# Patient Record
Sex: MALE | Race: ASIAN | NOT HISPANIC OR LATINO | Employment: UNEMPLOYED | URBAN - METROPOLITAN AREA
[De-identification: names, ages, dates, MRNs, and addresses within clinical notes are randomized per-mention and may not be internally consistent; named-entity substitution may affect disease eponyms.]

---

## 2017-03-27 ENCOUNTER — COMMUNICATION - HEALTHEAST (OUTPATIENT)
Dept: FAMILY MEDICINE | Facility: CLINIC | Age: 62
End: 2017-03-27

## 2017-03-27 ENCOUNTER — COMMUNICATION - HEALTHEAST (OUTPATIENT)
Dept: ADMINISTRATIVE | Facility: CLINIC | Age: 62
End: 2017-03-27

## 2017-03-27 DIAGNOSIS — E11.9 TYPE 2 DIABETES MELLITUS (H): ICD-10-CM

## 2018-01-09 ENCOUNTER — OFFICE VISIT - HEALTHEAST (OUTPATIENT)
Dept: FAMILY MEDICINE | Facility: CLINIC | Age: 63
End: 2018-01-09

## 2018-01-09 DIAGNOSIS — J45.20 MILD INTERMITTENT ASTHMA WITHOUT COMPLICATION: ICD-10-CM

## 2018-01-09 DIAGNOSIS — I10 ESSENTIAL HYPERTENSION WITH GOAL BLOOD PRESSURE LESS THAN 140/90: ICD-10-CM

## 2018-01-09 DIAGNOSIS — E78.2 MIXED HYPERLIPIDEMIA: ICD-10-CM

## 2018-01-09 DIAGNOSIS — E11.9 TYPE 2 DIABETES MELLITUS WITHOUT COMPLICATION, WITHOUT LONG-TERM CURRENT USE OF INSULIN (H): ICD-10-CM

## 2018-01-09 DIAGNOSIS — F51.01 PRIMARY INSOMNIA: ICD-10-CM

## 2018-01-09 DIAGNOSIS — Z23 NEED FOR VACCINATION: ICD-10-CM

## 2018-01-09 ASSESSMENT — MIFFLIN-ST. JEOR: SCORE: 1362.12

## 2018-04-10 ENCOUNTER — OFFICE VISIT - HEALTHEAST (OUTPATIENT)
Dept: FAMILY MEDICINE | Facility: CLINIC | Age: 63
End: 2018-04-10

## 2018-04-10 DIAGNOSIS — E78.2 MIXED HYPERLIPIDEMIA: ICD-10-CM

## 2018-04-10 DIAGNOSIS — E11.9 TYPE 2 DIABETES MELLITUS WITHOUT COMPLICATION, WITHOUT LONG-TERM CURRENT USE OF INSULIN (H): ICD-10-CM

## 2018-04-10 LAB
ALBUMIN SERPL-MCNC: 3.9 G/DL (ref 3.5–5)
ALP SERPL-CCNC: 87 U/L (ref 45–120)
ALT SERPL W P-5'-P-CCNC: 10 U/L (ref 0–45)
ANION GAP SERPL CALCULATED.3IONS-SCNC: 12 MMOL/L (ref 5–18)
AST SERPL W P-5'-P-CCNC: 16 U/L (ref 0–40)
BILIRUB SERPL-MCNC: 0.4 MG/DL (ref 0–1)
BUN SERPL-MCNC: 17 MG/DL (ref 8–22)
CALCIUM SERPL-MCNC: 9.1 MG/DL (ref 8.5–10.5)
CHLORIDE BLD-SCNC: 102 MMOL/L (ref 98–107)
CO2 SERPL-SCNC: 23 MMOL/L (ref 22–31)
CREAT SERPL-MCNC: 0.8 MG/DL (ref 0.7–1.3)
GFR SERPL CREATININE-BSD FRML MDRD: >60 ML/MIN/1.73M2
GLUCOSE BLD-MCNC: 256 MG/DL (ref 70–125)
HBA1C MFR BLD: 11.1 % (ref 3.5–6)
LDLC SERPL CALC-MCNC: 133 MG/DL
POTASSIUM BLD-SCNC: 4.5 MMOL/L (ref 3.5–5)
PROT SERPL-MCNC: 7.3 G/DL (ref 6–8)
SODIUM SERPL-SCNC: 137 MMOL/L (ref 136–145)

## 2018-04-10 ASSESSMENT — MIFFLIN-ST. JEOR: SCORE: 1396.14

## 2018-07-13 ENCOUNTER — OFFICE VISIT - HEALTHEAST (OUTPATIENT)
Dept: FAMILY MEDICINE | Facility: CLINIC | Age: 63
End: 2018-07-13

## 2018-07-13 DIAGNOSIS — E78.2 MIXED HYPERLIPIDEMIA: ICD-10-CM

## 2018-07-13 DIAGNOSIS — E11.9 TYPE 2 DIABETES MELLITUS WITHOUT COMPLICATION, WITHOUT LONG-TERM CURRENT USE OF INSULIN (H): ICD-10-CM

## 2018-07-13 DIAGNOSIS — I10 ESSENTIAL HYPERTENSION WITH GOAL BLOOD PRESSURE LESS THAN 140/90: ICD-10-CM

## 2018-07-13 LAB
HBA1C MFR BLD: 8.5 % (ref 3.5–6)
LDLC SERPL CALC-MCNC: 142 MG/DL

## 2018-07-13 ASSESSMENT — MIFFLIN-ST. JEOR: SCORE: 1397.28

## 2018-08-17 ENCOUNTER — OFFICE VISIT - HEALTHEAST (OUTPATIENT)
Dept: FAMILY MEDICINE | Facility: CLINIC | Age: 63
End: 2018-08-17

## 2018-08-17 DIAGNOSIS — I10 ESSENTIAL HYPERTENSION WITH GOAL BLOOD PRESSURE LESS THAN 140/90: ICD-10-CM

## 2018-08-17 DIAGNOSIS — E11.9 TYPE 2 DIABETES MELLITUS WITHOUT COMPLICATION, WITHOUT LONG-TERM CURRENT USE OF INSULIN (H): ICD-10-CM

## 2018-08-17 DIAGNOSIS — E78.2 MIXED HYPERLIPIDEMIA: ICD-10-CM

## 2018-08-17 LAB
CREAT UR-MCNC: 54.4 MG/DL
MICROALBUMIN UR-MCNC: 0.51 MG/DL (ref 0–1.99)
MICROALBUMIN/CREAT UR: 9.4 MG/G

## 2018-08-17 ASSESSMENT — MIFFLIN-ST. JEOR: SCORE: 1393.88

## 2018-09-28 ENCOUNTER — OFFICE VISIT - HEALTHEAST (OUTPATIENT)
Dept: FAMILY MEDICINE | Facility: CLINIC | Age: 63
End: 2018-09-28

## 2018-09-28 DIAGNOSIS — I10 ESSENTIAL HYPERTENSION WITH GOAL BLOOD PRESSURE LESS THAN 140/90: ICD-10-CM

## 2018-09-28 DIAGNOSIS — Z23 NEED FOR VACCINATION: ICD-10-CM

## 2018-09-28 ASSESSMENT — MIFFLIN-ST. JEOR: SCORE: 1372.33

## 2018-11-09 ENCOUNTER — OFFICE VISIT - HEALTHEAST (OUTPATIENT)
Dept: FAMILY MEDICINE | Facility: CLINIC | Age: 63
End: 2018-11-09

## 2018-11-09 DIAGNOSIS — E78.2 MIXED HYPERLIPIDEMIA: ICD-10-CM

## 2018-11-09 DIAGNOSIS — E11.9 TYPE 2 DIABETES MELLITUS WITHOUT COMPLICATION, WITHOUT LONG-TERM CURRENT USE OF INSULIN (H): ICD-10-CM

## 2018-11-09 LAB
HBA1C MFR BLD: 11.4 % (ref 3.5–6)
LDLC SERPL CALC-MCNC: 126 MG/DL

## 2018-11-09 ASSESSMENT — MIFFLIN-ST. JEOR: SCORE: 1364.39

## 2019-02-15 ENCOUNTER — OFFICE VISIT - HEALTHEAST (OUTPATIENT)
Dept: FAMILY MEDICINE | Facility: CLINIC | Age: 64
End: 2019-02-15

## 2019-02-15 DIAGNOSIS — E11.9 TYPE 2 DIABETES MELLITUS WITHOUT COMPLICATION, WITHOUT LONG-TERM CURRENT USE OF INSULIN (H): ICD-10-CM

## 2019-02-15 DIAGNOSIS — E78.2 MIXED HYPERLIPIDEMIA: ICD-10-CM

## 2019-02-15 DIAGNOSIS — J45.20 MILD INTERMITTENT ASTHMA WITHOUT COMPLICATION: ICD-10-CM

## 2019-02-15 DIAGNOSIS — F51.01 PRIMARY INSOMNIA: ICD-10-CM

## 2019-02-15 DIAGNOSIS — I10 ESSENTIAL HYPERTENSION WITH GOAL BLOOD PRESSURE LESS THAN 140/90: ICD-10-CM

## 2019-02-15 LAB — HBA1C MFR BLD: 10.7 % (ref 3.5–6)

## 2019-02-15 ASSESSMENT — MIFFLIN-ST. JEOR: SCORE: 1370.06

## 2019-05-13 ENCOUNTER — COMMUNICATION - HEALTHEAST (OUTPATIENT)
Dept: FAMILY MEDICINE | Facility: CLINIC | Age: 64
End: 2019-05-13

## 2019-05-21 ENCOUNTER — OFFICE VISIT - HEALTHEAST (OUTPATIENT)
Dept: FAMILY MEDICINE | Facility: CLINIC | Age: 64
End: 2019-05-21

## 2019-05-21 DIAGNOSIS — E11.9 TYPE 2 DIABETES MELLITUS WITHOUT COMPLICATION, WITHOUT LONG-TERM CURRENT USE OF INSULIN (H): ICD-10-CM

## 2019-05-21 LAB
ALBUMIN SERPL-MCNC: 4.1 G/DL (ref 3.5–5)
ALP SERPL-CCNC: 96 U/L (ref 45–120)
ALT SERPL W P-5'-P-CCNC: <9 U/L (ref 0–45)
ANION GAP SERPL CALCULATED.3IONS-SCNC: 10 MMOL/L (ref 5–18)
AST SERPL W P-5'-P-CCNC: 13 U/L (ref 0–40)
BILIRUB SERPL-MCNC: 0.4 MG/DL (ref 0–1)
BUN SERPL-MCNC: 15 MG/DL (ref 8–22)
CALCIUM SERPL-MCNC: 9.7 MG/DL (ref 8.5–10.5)
CHLORIDE BLD-SCNC: 101 MMOL/L (ref 98–107)
CO2 SERPL-SCNC: 26 MMOL/L (ref 22–31)
CREAT SERPL-MCNC: 0.84 MG/DL (ref 0.7–1.3)
GFR SERPL CREATININE-BSD FRML MDRD: >60 ML/MIN/1.73M2
GLUCOSE BLD-MCNC: 224 MG/DL (ref 70–125)
HBA1C MFR BLD: 11.4 % (ref 3.5–6)
POTASSIUM BLD-SCNC: 4.4 MMOL/L (ref 3.5–5)
PROT SERPL-MCNC: 7.5 G/DL (ref 6–8)
SODIUM SERPL-SCNC: 137 MMOL/L (ref 136–145)

## 2019-05-21 ASSESSMENT — MIFFLIN-ST. JEOR: SCORE: 1382.41

## 2019-07-05 ENCOUNTER — COMMUNICATION - HEALTHEAST (OUTPATIENT)
Dept: FAMILY MEDICINE | Facility: CLINIC | Age: 64
End: 2019-07-05

## 2019-07-05 DIAGNOSIS — I10 ESSENTIAL HYPERTENSION WITH GOAL BLOOD PRESSURE LESS THAN 140/90: ICD-10-CM

## 2019-08-23 ENCOUNTER — OFFICE VISIT - HEALTHEAST (OUTPATIENT)
Dept: FAMILY MEDICINE | Facility: CLINIC | Age: 64
End: 2019-08-23

## 2019-08-23 DIAGNOSIS — E11.9 TYPE 2 DIABETES MELLITUS WITHOUT COMPLICATION, WITHOUT LONG-TERM CURRENT USE OF INSULIN (H): ICD-10-CM

## 2019-08-23 LAB
CREAT UR-MCNC: 154.2 MG/DL
HBA1C MFR BLD: 9 % (ref 3.5–6)
MICROALBUMIN UR-MCNC: 3.53 MG/DL (ref 0–1.99)
MICROALBUMIN/CREAT UR: 22.9 MG/G

## 2019-08-23 ASSESSMENT — MIFFLIN-ST. JEOR: SCORE: 1402.79

## 2019-08-26 RX ORDER — GLUCOSAMINE HCL/CHONDROITIN SU 500-400 MG
1 CAPSULE ORAL DAILY
Qty: 50 STRIP | Refills: 11 | Status: SHIPPED | OUTPATIENT
Start: 2019-08-26 | End: 2022-08-27

## 2019-08-29 ENCOUNTER — COMMUNICATION - HEALTHEAST (OUTPATIENT)
Dept: FAMILY MEDICINE | Facility: CLINIC | Age: 64
End: 2019-08-29

## 2019-08-29 DIAGNOSIS — E78.2 MIXED HYPERLIPIDEMIA: ICD-10-CM

## 2019-09-09 ENCOUNTER — COMMUNICATION - HEALTHEAST (OUTPATIENT)
Dept: ADMINISTRATIVE | Facility: CLINIC | Age: 64
End: 2019-09-09

## 2019-10-14 ENCOUNTER — COMMUNICATION - HEALTHEAST (OUTPATIENT)
Dept: FAMILY MEDICINE | Facility: CLINIC | Age: 64
End: 2019-10-14

## 2019-10-18 ENCOUNTER — OFFICE VISIT - HEALTHEAST (OUTPATIENT)
Dept: FAMILY MEDICINE | Facility: CLINIC | Age: 64
End: 2019-10-18

## 2019-10-18 DIAGNOSIS — Z23 NEED FOR IMMUNIZATION AGAINST INFLUENZA: ICD-10-CM

## 2019-10-18 DIAGNOSIS — R41.3 MEMORY LOSS: ICD-10-CM

## 2019-10-18 RX ORDER — BLOOD-GLUCOSE METER
EACH MISCELLANEOUS
Refills: 0 | Status: SHIPPED | COMMUNITY
Start: 2019-08-26 | End: 2022-08-27

## 2019-10-18 ASSESSMENT — MIFFLIN-ST. JEOR: SCORE: 1406.19

## 2019-10-25 ENCOUNTER — COMMUNICATION - HEALTHEAST (OUTPATIENT)
Dept: FAMILY MEDICINE | Facility: CLINIC | Age: 64
End: 2019-10-25

## 2019-11-20 ENCOUNTER — COMMUNICATION - HEALTHEAST (OUTPATIENT)
Dept: EDUCATION SERVICES | Facility: CLINIC | Age: 64
End: 2019-11-20

## 2019-11-20 ENCOUNTER — AMBULATORY - HEALTHEAST (OUTPATIENT)
Dept: EDUCATION SERVICES | Facility: CLINIC | Age: 64
End: 2019-11-20

## 2019-11-22 ENCOUNTER — COMMUNICATION - HEALTHEAST (OUTPATIENT)
Dept: FAMILY MEDICINE | Facility: CLINIC | Age: 64
End: 2019-11-22

## 2020-01-20 ENCOUNTER — OFFICE VISIT - HEALTHEAST (OUTPATIENT)
Dept: FAMILY MEDICINE | Facility: CLINIC | Age: 65
End: 2020-01-20

## 2020-01-20 DIAGNOSIS — F51.01 PRIMARY INSOMNIA: ICD-10-CM

## 2020-01-20 DIAGNOSIS — E11.9 TYPE 2 DIABETES MELLITUS WITHOUT COMPLICATION, WITHOUT LONG-TERM CURRENT USE OF INSULIN (H): ICD-10-CM

## 2020-01-20 DIAGNOSIS — I10 ESSENTIAL HYPERTENSION WITH GOAL BLOOD PRESSURE LESS THAN 140/90: ICD-10-CM

## 2020-01-20 DIAGNOSIS — J45.20 MILD INTERMITTENT ASTHMA WITHOUT COMPLICATION: ICD-10-CM

## 2020-01-20 DIAGNOSIS — E78.2 MIXED HYPERLIPIDEMIA: ICD-10-CM

## 2020-01-20 RX ORDER — ALBUTEROL SULFATE 90 UG/1
2 AEROSOL, METERED RESPIRATORY (INHALATION) EVERY 4 HOURS PRN
Qty: 18 G | Refills: 11 | Status: SHIPPED | OUTPATIENT
Start: 2020-01-20 | End: 2021-10-04

## 2020-01-20 ASSESSMENT — MIFFLIN-ST. JEOR: SCORE: 1366.53

## 2020-01-21 LAB — HBA1C MFR BLD: 12.4 % (ref 4.2–6.1)

## 2020-07-24 ENCOUNTER — OFFICE VISIT - HEALTHEAST (OUTPATIENT)
Dept: FAMILY MEDICINE | Facility: CLINIC | Age: 65
End: 2020-07-24

## 2020-07-24 DIAGNOSIS — E11.9 TYPE 2 DIABETES MELLITUS WITHOUT COMPLICATION, WITHOUT LONG-TERM CURRENT USE OF INSULIN (H): ICD-10-CM

## 2020-07-24 ASSESSMENT — PATIENT HEALTH QUESTIONNAIRE - PHQ9: SUM OF ALL RESPONSES TO PHQ QUESTIONS 1-9: 0

## 2021-01-29 ENCOUNTER — OFFICE VISIT - HEALTHEAST (OUTPATIENT)
Dept: FAMILY MEDICINE | Facility: CLINIC | Age: 66
End: 2021-01-29

## 2021-01-29 ENCOUNTER — COMMUNICATION - HEALTHEAST (OUTPATIENT)
Dept: FAMILY MEDICINE | Facility: CLINIC | Age: 66
End: 2021-01-29

## 2021-01-29 DIAGNOSIS — Z23 IMMUNIZATION DUE: ICD-10-CM

## 2021-01-29 DIAGNOSIS — E78.2 MIXED HYPERLIPIDEMIA: ICD-10-CM

## 2021-01-29 DIAGNOSIS — E11.9 TYPE 2 DIABETES MELLITUS WITHOUT COMPLICATION, WITHOUT LONG-TERM CURRENT USE OF INSULIN (H): ICD-10-CM

## 2021-01-29 DIAGNOSIS — I10 ESSENTIAL HYPERTENSION WITH GOAL BLOOD PRESSURE LESS THAN 140/90: ICD-10-CM

## 2021-01-29 LAB
CREAT UR-MCNC: 36.5 MG/DL
HBA1C MFR BLD: 9.2 %
MICROALBUMIN UR-MCNC: 0.5 MG/DL (ref 0–1.99)
MICROALBUMIN/CREAT UR: 13.7 MG/G

## 2021-01-29 ASSESSMENT — MIFFLIN-ST. JEOR: SCORE: 1415.42

## 2021-02-01 ENCOUNTER — COMMUNICATION - HEALTHEAST (OUTPATIENT)
Dept: FAMILY MEDICINE | Facility: CLINIC | Age: 66
End: 2021-02-01

## 2021-02-01 DIAGNOSIS — E11.9 TYPE 2 DIABETES MELLITUS WITHOUT COMPLICATION, WITHOUT LONG-TERM CURRENT USE OF INSULIN (H): ICD-10-CM

## 2021-02-01 DIAGNOSIS — I10 ESSENTIAL HYPERTENSION WITH GOAL BLOOD PRESSURE LESS THAN 140/90: ICD-10-CM

## 2021-02-01 DIAGNOSIS — E78.2 MIXED HYPERLIPIDEMIA: ICD-10-CM

## 2021-02-01 DIAGNOSIS — F51.01 PRIMARY INSOMNIA: ICD-10-CM

## 2021-02-02 RX ORDER — ATORVASTATIN CALCIUM 20 MG/1
20 TABLET, FILM COATED ORAL DAILY
Qty: 30 TABLET | Refills: 11 | Status: SHIPPED | OUTPATIENT
Start: 2021-02-02 | End: 2021-10-04

## 2021-02-02 RX ORDER — LISINOPRIL 20 MG/1
20 TABLET ORAL DAILY
Qty: 30 TABLET | Refills: 11 | Status: SHIPPED | OUTPATIENT
Start: 2021-02-02 | End: 2021-10-04

## 2021-02-02 RX ORDER — GLIPIZIDE 5 MG/1
10 TABLET, FILM COATED, EXTENDED RELEASE ORAL
Qty: 60 TABLET | Refills: 11 | Status: SHIPPED | OUTPATIENT
Start: 2021-02-02 | End: 2021-10-04

## 2021-02-02 RX ORDER — TRAZODONE HYDROCHLORIDE 50 MG/1
TABLET, FILM COATED ORAL
Qty: 30 TABLET | Refills: 11 | Status: SHIPPED | OUTPATIENT
Start: 2021-02-02 | End: 2021-10-04

## 2021-02-26 ENCOUNTER — COMMUNICATION - HEALTHEAST (OUTPATIENT)
Dept: FAMILY MEDICINE | Facility: CLINIC | Age: 66
End: 2021-02-26

## 2021-03-15 ENCOUNTER — COMMUNICATION - HEALTHEAST (OUTPATIENT)
Dept: FAMILY MEDICINE | Facility: CLINIC | Age: 66
End: 2021-03-15

## 2021-03-19 ENCOUNTER — COMMUNICATION - HEALTHEAST (OUTPATIENT)
Dept: FAMILY MEDICINE | Facility: CLINIC | Age: 66
End: 2021-03-19

## 2021-04-02 ENCOUNTER — OFFICE VISIT - HEALTHEAST (OUTPATIENT)
Dept: FAMILY MEDICINE | Facility: CLINIC | Age: 66
End: 2021-04-02

## 2021-04-02 ENCOUNTER — COMMUNICATION - HEALTHEAST (OUTPATIENT)
Dept: FAMILY MEDICINE | Facility: CLINIC | Age: 66
End: 2021-04-02

## 2021-04-02 DIAGNOSIS — I10 ESSENTIAL HYPERTENSION WITH GOAL BLOOD PRESSURE LESS THAN 140/90: ICD-10-CM

## 2021-04-02 RX ORDER — AMLODIPINE BESYLATE 10 MG/1
10 TABLET ORAL DAILY
Qty: 30 TABLET | Refills: 11 | Status: SHIPPED | OUTPATIENT
Start: 2021-04-02 | End: 2021-10-04

## 2021-04-02 ASSESSMENT — MIFFLIN-ST. JEOR: SCORE: 1417.69

## 2021-04-10 ENCOUNTER — AMBULATORY - HEALTHEAST (OUTPATIENT)
Dept: NURSING | Facility: CLINIC | Age: 66
End: 2021-04-10

## 2021-05-27 ASSESSMENT — PATIENT HEALTH QUESTIONNAIRE - PHQ9: SUM OF ALL RESPONSES TO PHQ QUESTIONS 1-9: 0

## 2021-05-28 ASSESSMENT — ASTHMA QUESTIONNAIRES: ACT_TOTALSCORE: 25

## 2021-05-28 NOTE — TELEPHONE ENCOUNTER
"  *  Siasto. [address] [place] [address], [place] [address]. Brian Industries, MeetMoi. [address] [place] [id number]. All rights reserved. This information is not intended as a substitute for professional medical care. Always follow your healthcare professional's instructions.

## 2021-05-28 NOTE — TELEPHONE ENCOUNTER
Who is calling:  Patient's daughter in law Cynthia  Reason for Call:  Calling to cancel and reschedule patient's appointment. He is now scheduled for May 21st at 3:20 PM at [facility]. Can transportation be arranged? Thank you.   Date of last appointment with primary care: 2/15/19  Okay to leave a detailed message: Yes

## 2021-05-29 NOTE — PROGRESS NOTES
"Assessment: /    Plan:    1. Type 2 diabetes mellitus without complication, without long-term current use of insulin (H)  Comprehensive Metabolic Panel    Glycosylated Hemoglobin A1c    metFORMIN (GLUCOPHAGE) 500 MG tablet       He will take 2 of the glipizide daily.  Increase metformin to 2 tablets twice daily.    Prescription written for glucometer, test strips, lancets and lancing device.  He will bring this to his pharmacy.    Recheck in 3 months.  Patient was seen with professional Desiree , Stanley Reed.      Subjective:    HPI:  Jorge Bourne is a 64-year-old male presenting for follow-up on diabetes.  He has only been taking 1 of the glipizide each day instead of 2.  He takes metformin, 2 tablets in the morning and 1 in the evening.  He eats rice and fish.    His glucometer is old, and he is no longer able to get strips for it.      Review of Systems: No fever or cough.      Current Outpatient Medications   Medication Sig Dispense Refill     albuterol (PROAIR HFA;PROVENTIL HFA;VENTOLIN HFA) 90 mcg/actuation inhaler Inhale 2 puffs every 4 (four) hours as needed. 18 g 11     atorvastatin (LIPITOR) 20 MG tablet Take 1 tablet (20 mg total) by mouth daily. 30 tablet 11     glipiZIDE (GLUCOTROL XL) 5 MG 24 hr tablet Take 2 tablets (10 mg total) by mouth daily with breakfast. 60 tablet 11     lisinopril (PRINIVIL,ZESTRIL) 20 MG tablet Take 1 tablet (20 mg total) by mouth daily. 30 tablet 11     metFORMIN (GLUCOPHAGE) 500 MG tablet Take 2 tablets (1,000 mg total) by mouth 2 (two) times a day with meals. 120 tablet 11     traZODone (DESYREL) 50 MG tablet Take 1/2 to 1 Tablet by Mouth Nightly At Bedtime as Needed For Sleep 30 tablet 11     No current facility-administered medications for this visit.          Objective:    Vitals:    05/21/19 1523 05/21/19 1527   BP: 144/84 134/76   Pulse: 73    Resp: 19    Temp: 98.7  F (37.1  C)    TempSrc: Oral    SpO2: 97%    Weight: 153 lb 12 oz (69.7 kg)    Height: 5' 2.99\" (1.6 " m)        Gen:  NAD, VSS  Lungs:  normal  Heart:  normal  Feet without ulceration, normal monofilament testing    A1c is 11.4      ADDITIONAL HISTORY SUMMARIZED (2): None.  DECISION TO OBTAIN EXTRA INFORMATION (1): None.   RADIOLOGY TESTS (1): None.  LABS (1): A1c.  MEDICINE TESTS (1): None.  INDEPENDENT REVIEW (2 each): None.     Total Data Points: 1

## 2021-05-30 NOTE — TELEPHONE ENCOUNTER
RN cannot approve Refill Request    RN can NOT refill this medication medication not on med list. Last office visit: 5/21/2019 Omar Chaney MD Last Physical: Visit date not found Last MTM visit: Visit date not found Last visit same specialty: 5/21/2019 Omar Chaney MD.  Next visit within 3 mo: Visit date not found  Next physical within 3 mo: Visit date not found  Dosage changed. Message sent to pharmacy.     Tata Bobby, [facility] Triage/Med Refill 7/6/2019    Requested Prescriptions   Pending Prescriptions Disp Refills     lisinopril (PRINIVIL,ZESTRIL) 5 MG tablet [Pharmacy Med Name: LISINOPRIL 5MG TABLETS] 30 tablet 0     Sig: TAKE 1 TABLET BY MOUTH EVERY DAY       [facility] [employer] Refill Protocol Passed - 7/5/2019  9:30 AM        Passed - PCP or prescribing provider visit in past 12 months       Last office visit with prescriber/PCP: 5/21/2019 Omar Chaney MD OR same dept: 5/21/2019 Omar Chaney MD OR same specialty: 5/21/2019 Omar Chaney MD  Last physical: Visit date not found Last MTM visit: Visit date not found   Next visit within 3 mo: Visit date not found  Next physical within 3 mo: Visit date not found  Prescriber OR PCP: Omar Chaney MD  Last diagnosis associated with med order: 1. Essential hypertension with goal blood pressure less than 140/90  - lisinopril (PRINIVIL,ZESTRIL) 5 MG tablet [Pharmacy Med Name: LISINOPRIL 5MG TABLETS]; TAKE 1 TABLET BY MOUTH EVERY DAY  Dispense: 30 tablet; Refill: 0    If protocol passes may refill for 12 months if within 3 months of last provider visit (or a total of 15 months).             Passed - Serum Potassium in past 12 months     Lab Results   Component Value Date    Potassium 4.4 05/21/2019             Passed - Blood pressure filed in past 12 months     BP Readings from Last 1 Encounters:   05/21/19 134/76             Passed - Serum Creatinine in past 12 months     Creatinine   Date Value Ref Range Status    05/21/2019 0.84 0.70 - 1.30 mg/dL Final

## 2021-05-31 VITALS — BODY MASS INDEX: 26.45 KG/M2 | HEIGHT: 63 IN | WEIGHT: 149.25 LBS

## 2021-05-31 NOTE — PROGRESS NOTES
Assessment: /    Plan:    1. Type 2 diabetes mellitus without complication, without long-term current use of insulin (H)  Microalbumin, Random Urine    Glycosylated Hemoglobin A1c    blood glucose meter (GLUCOMETER)    blood glucose test strips    generic lancets (FINGERSTIX LANCETS)    Ambulatory referral to Diabetes Education Program (CDE)       Decrease intake of rice.  He has had improvement in his A1c with increased medication, however he is not at goal.  Referral for diabetes education.  I sent prescriptions to his pharmacy for glucometer, strips and lancets.    Return in 1 month for citizenship waiver.    Patient was seen with professional Desiree Tiffany.      Subjective:    HPI:  Jorge Bourne is a 64-year-old male presenting for follow-up on diabetes.  He has been taking metformin 1000 mg twice daily and glipizide 10 mg daily.  Previous A1c was 11.4.  He was unable to obtain a glucometer from the pharmacy.      Review of Systems: No fever, cough or chest pain.      Current Outpatient Medications   Medication Sig Dispense Refill     albuterol (PROAIR HFA;PROVENTIL HFA;VENTOLIN HFA) 90 mcg/actuation inhaler Inhale 2 puffs every 4 (four) hours as needed. 18 g 11     atorvastatin (LIPITOR) 20 MG tablet Take 1 tablet (20 mg total) by mouth daily. 30 tablet 11     glipiZIDE (GLUCOTROL XL) 5 MG 24 hr tablet Take 2 tablets (10 mg total) by mouth daily with breakfast. 60 tablet 11     lisinopril (PRINIVIL,ZESTRIL) 20 MG tablet Take 1 tablet (20 mg total) by mouth daily. 30 tablet 11     metFORMIN (GLUCOPHAGE) 500 MG tablet Take 2 tablets (1,000 mg total) by mouth 2 (two) times a day with meals. 120 tablet 11     traZODone (DESYREL) 50 MG tablet Take 1/2 to 1 Tablet by Mouth Nightly At Bedtime as Needed For Sleep 30 tablet 11     blood glucose meter (GLUCOMETER) Use 1 each As Directed daily. Dispense glucometer brand per patient's insurance at pharmacy discretion. 1 each 0     blood glucose test strips Use 1  "each As Directed daily. Dispense brand per patient's insurance at pharmacy discretion. 50 strip 11     generic lancets (FINGERSTIX LANCETS) Dispense brand per patient's insurance at pharmacy discretion. 50 each 11     No current facility-administered medications for this visit.          Objective:    Vitals:    08/23/19 1551 08/23/19 1556 08/23/19 1627   BP: 150/78 150/80 148/86   Pulse: 72     Resp: 20     Temp: 98.6  F (37  C)     TempSrc: Oral     SpO2: 97%     Weight: 158 lb 4 oz (71.8 kg)     Height: 5' 2.99\" (1.6 m)         Gen:  NAD, VSS  Lungs:  normal  Heart:  normal    A1c is 9.0      ADDITIONAL HISTORY SUMMARIZED (2): None.  DECISION TO OBTAIN EXTRA INFORMATION (1): None.   RADIOLOGY TESTS (1): None.  LABS (1): A1c.  MEDICINE TESTS (1): None.  INDEPENDENT REVIEW (2 each): None.     Total Data Points: 1    "

## 2021-05-31 NOTE — TELEPHONE ENCOUNTER
RN cannot approve Refill Request    RN can NOT refill this medication medication not on med list. Last office visit: 8/23/2019 Omar Chaney MD Last Physical: Visit date not found Last MTM visit: Visit date not found Last visit same specialty: 8/23/2019 Omar Chaney MD.  Next visit within 3 mo: Visit date not found  Next physical within 3 mo: Visit date not found      Nelida Hargrove, [facility] Triage/Med Refill 8/30/2019    Requested Prescriptions   Pending Prescriptions Disp Refills     simvastatin (ZOCOR) 20 MG tablet [Pharmacy Med Name: SIMVASTATIN 20MG TABLETS] 30 tablet 0     Sig: TAKE 1 TABLET BY MOUTH EVERY NIGHT AT BEDTIME       Statins Refill Protocol (Hmg CoA Reductase Inhibitors) Passed - 8/29/2019  5:03 AM        Passed - PCP or prescribing provider visit in past 12 months      Last office visit with prescriber/PCP: 8/23/2019 Omar Chaney MD OR same dept: 8/23/2019 Omar Chaney MD OR same specialty: 8/23/2019 Omar Chaney MD  Last physical: Visit date not found Last MTM visit: Visit date not found   Next visit within 3 mo: Visit date not found  Next physical within 3 mo: Visit date not found  Prescriber OR PCP: Omar Chaney MD  Last diagnosis associated with med order: 1. Mixed hyperlipidemia  - simvastatin (ZOCOR) 20 MG tablet [Pharmacy Med Name: SIMVASTATIN 20MG TABLETS]; TAKE 1 TABLET BY MOUTH EVERY NIGHT AT BEDTIME  Dispense: 30 tablet; Refill: 0    If protocol passes may refill for 12 months if within 3 months of last provider visit (or a total of 15 months).

## 2021-06-01 VITALS — WEIGHT: 156.75 LBS | BODY MASS INDEX: 27.77 KG/M2 | HEIGHT: 63 IN

## 2021-06-01 VITALS — BODY MASS INDEX: 27.82 KG/M2 | HEIGHT: 63 IN | WEIGHT: 157 LBS

## 2021-06-01 VITALS — BODY MASS INDEX: 27.68 KG/M2 | HEIGHT: 63 IN | WEIGHT: 156.25 LBS

## 2021-06-02 VITALS — WEIGHT: 149.75 LBS | HEIGHT: 63 IN | BODY MASS INDEX: 26.54 KG/M2

## 2021-06-02 VITALS — HEIGHT: 63 IN | BODY MASS INDEX: 26.75 KG/M2 | WEIGHT: 151 LBS

## 2021-06-02 VITALS — WEIGHT: 151.5 LBS | HEIGHT: 63 IN | BODY MASS INDEX: 26.84 KG/M2

## 2021-06-02 NOTE — TELEPHONE ENCOUNTER
Called pt as FIT cards deferred till 2021.  Pt declined to have done. Pt completed when came to this country. Thanks.

## 2021-06-02 NOTE — PROGRESS NOTES
Assessment: /    Plan:    1. Memory loss     2. Need for immunization against influenza  Influenza, Recombinant, Inj, Quadrivalent, PF, 18+YRS       I completed form N-648 and gave the original to the patient to take to immigration.    Recheck in 3 months.    Patient was seen with professional Desiree , Stanley Portillo.    This was a 28 minute visit with >50% of the time spent in face-to-face counseling and coordination of care regarding: Memory loss.      Subjective:    HPI:  Jorge Bourne is a 64-year-old male presenting for citizenship waiver.  He has memory loss.       Review of Systems: No fever or cough.      Current Outpatient Medications   Medication Sig Dispense Refill     albuterol (PROAIR HFA;PROVENTIL HFA;VENTOLIN HFA) 90 mcg/actuation inhaler Inhale 2 puffs every 4 (four) hours as needed. 18 g 11     blood glucose meter (GLUCOMETER) Use 1 each As Directed daily. Dispense glucometer brand per patient's insurance at pharmacy discretion. 1 each 0     blood glucose test strips Use 1 each As Directed daily. Dispense brand per patient's insurance at pharmacy discretion. 50 strip 11     generic lancets (FINGERSTIX LANCETS) Dispense brand per patient's insurance at pharmacy discretion. 50 each 11     metFORMIN (GLUCOPHAGE) 500 MG tablet Take 2 tablets (1,000 mg total) by mouth 2 (two) times a day with meals. 120 tablet 11     ACCU-CHEK GUIDE GLUCOSE METER Misc U UTD D  0     atorvastatin (LIPITOR) 20 MG tablet Take 1 tablet (20 mg total) by mouth daily. (Patient not taking: Reported on 10/18/2019      ) 30 tablet 11     glipiZIDE (GLUCOTROL XL) 5 MG 24 hr tablet Take 2 tablets (10 mg total) by mouth daily with breakfast. (Patient not taking: Reported on 10/18/2019      ) 60 tablet 11     lisinopril (PRINIVIL,ZESTRIL) 20 MG tablet Take 1 tablet (20 mg total) by mouth daily. (Patient not taking: Reported on 10/18/2019      ) 30 tablet 11     simvastatin (ZOCOR) 20 MG tablet TK 1 T PO  QHS  8     traZODone  "(DESYREL) 50 MG tablet Take 1/2 to 1 Tablet by Mouth Nightly At Bedtime as Needed For Sleep (Patient not taking: Reported on 10/18/2019      ) 30 tablet 11     No current facility-administered medications for this visit.          Objective:    Vitals:    10/18/19 1548 10/18/19 1610   BP: 150/84 150/84   Pulse: 73    Resp: 20    Temp: 98.3  F (36.8  C)    TempSrc: Oral    SpO2: 95%    Weight: 159 lb (72.1 kg)    Height: 5' 2.99\" (1.6 m)        Gen:  NAD, VSS  Lungs:  normal  Heart:  normal  BIMS memory test, score = 10/15.  He was able to state the year and day correctly.  He was unable to state the correct month.  He recalled 2 of 3 items immediately and 2 of 3 items remotely.        ADDITIONAL HISTORY SUMMARIZED (2): None.  DECISION TO OBTAIN EXTRA INFORMATION (1): None.   RADIOLOGY TESTS (1): None.  LABS (1): None.  MEDICINE TESTS (1): None.  INDEPENDENT REVIEW (2 each): None.     Total Data Points: 0    "

## 2021-06-02 NOTE — TELEPHONE ENCOUNTER
----- Message from Jesika Romero, [place] sent at 9/13/2019  4:03 PM CDT -----  Hello,    Based on A1c, this patient would benefit from visiting with MTM pharmacist.  Can we reach out to schedule an initial visit with me for MTM? Based on their current insurance, a visit with me is fully covered and free of charge.     Thank you  Abdullahi Romero, KyaD

## 2021-06-03 VITALS — WEIGHT: 153.75 LBS | HEIGHT: 63 IN | BODY MASS INDEX: 27.24 KG/M2

## 2021-06-03 VITALS
HEART RATE: 73 BPM | OXYGEN SATURATION: 95 % | HEIGHT: 63 IN | BODY MASS INDEX: 28.17 KG/M2 | SYSTOLIC BLOOD PRESSURE: 150 MMHG | TEMPERATURE: 98.3 F | WEIGHT: 159 LBS | DIASTOLIC BLOOD PRESSURE: 84 MMHG | RESPIRATION RATE: 20 BRPM

## 2021-06-03 VITALS — BODY MASS INDEX: 28.04 KG/M2 | WEIGHT: 158.25 LBS | HEIGHT: 63 IN

## 2021-06-03 NOTE — TELEPHONE ENCOUNTER
Pt did not show for DM appt today. Please contact pt to reschedule. Thanks    Meli Sepulveda RD, LD  Diabetes Care and Education

## 2021-06-04 ENCOUNTER — OFFICE VISIT - HEALTHEAST (OUTPATIENT)
Dept: FAMILY MEDICINE | Facility: CLINIC | Age: 66
End: 2021-06-04

## 2021-06-04 VITALS
WEIGHT: 150.25 LBS | HEART RATE: 69 BPM | DIASTOLIC BLOOD PRESSURE: 88 MMHG | BODY MASS INDEX: 26.62 KG/M2 | HEIGHT: 63 IN | OXYGEN SATURATION: 98 % | SYSTOLIC BLOOD PRESSURE: 154 MMHG | TEMPERATURE: 98.9 F | RESPIRATION RATE: 20 BRPM

## 2021-06-04 DIAGNOSIS — E11.9 TYPE 2 DIABETES MELLITUS WITHOUT COMPLICATION, WITHOUT LONG-TERM CURRENT USE OF INSULIN (H): ICD-10-CM

## 2021-06-04 LAB — HBA1C MFR BLD: 9.6 %

## 2021-06-04 ASSESSMENT — MIFFLIN-ST. JEOR: SCORE: 1406.49

## 2021-06-05 VITALS
WEIGHT: 161.5 LBS | SYSTOLIC BLOOD PRESSURE: 162 MMHG | OXYGEN SATURATION: 98 % | DIASTOLIC BLOOD PRESSURE: 80 MMHG | HEART RATE: 76 BPM | HEIGHT: 63 IN | TEMPERATURE: 98.4 F | BODY MASS INDEX: 28.62 KG/M2

## 2021-06-05 VITALS
SYSTOLIC BLOOD PRESSURE: 172 MMHG | OXYGEN SATURATION: 99 % | WEIGHT: 161 LBS | TEMPERATURE: 98.3 F | DIASTOLIC BLOOD PRESSURE: 84 MMHG | HEIGHT: 63 IN | HEART RATE: 73 BPM | BODY MASS INDEX: 28.53 KG/M2

## 2021-06-05 NOTE — PROGRESS NOTES
Assessment: /    Plan:    1. Type 2 diabetes mellitus without complication, without long-term current use of insulin (H)  Glycosylated Hemoglobin A1c    [facility] RED    glipiZIDE (GLUCOTROL XL) 5 MG 24 hr tablet    metFORMIN (GLUCOPHAGE) 500 MG tablet   2. Mild intermittent asthma without complication  albuterol (PROAIR HFA;PROVENTIL HFA;VENTOLIN HFA) 90 mcg/actuation inhaler   3. Mixed hyperlipidemia  atorvastatin (LIPITOR) 20 MG tablet   4. Essential hypertension with goal blood pressure less than 140/90  lisinopril (PRINIVIL,ZESTRIL) 20 MG tablet   5. Primary insomnia  traZODone (DESYREL) 50 MG tablet       Medications refilled.    Recheck in 4 months.    Patient was seen with professional Desiree Tiffany.      Subjective:    HPI:  Jorge Bourne is a 64-year-old male presenting for follow-up on diabetes.  He has been out of medications for 1 month.  He and his family did not go to the pharmacy to pick them up.    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    Review of Systems:  No fever, cough, or chest pain.      Current Outpatient Medications   Medication Sig Dispense Refill     ACCU-CHEK GUIDE GLUCOSE METER Misc U UTD D  0     albuterol (PROAIR HFA;PROVENTIL HFA;VENTOLIN HFA) 90 mcg/actuation inhaler Inhale 2 puffs every 4 (four) hours as needed. 18 g 11     atorvastatin (LIPITOR) 20 MG tablet Take 1 tablet (20 mg total) by mouth daily. 30 tablet 11     blood glucose meter (GLUCOMETER) Use 1 each As Directed daily. Dispense glucometer brand per patient's insurance at pharmacy discretion. 1 each 0     blood glucose test strips Use 1 each As Directed daily. Dispense brand per patient's insurance at pharmacy discretion. 50 strip 11     generic lancets (FINGERSTIX LANCETS) Dispense brand per patient's insurance at pharmacy discretion. 50 each 11     glipiZIDE (GLUCOTROL XL) 5 MG 24 hr tablet Take 2 tablets (10 mg total) by mouth daily with breakfast. 60 tablet 11      "lisinopril (PRINIVIL,ZESTRIL) 20 MG tablet Take 1 tablet (20 mg total) by mouth daily. 30 tablet 11     metFORMIN (GLUCOPHAGE) 500 MG tablet Take 2 tablets (1,000 mg total) by mouth 2 (two) times a day with meals. 120 tablet 11     traZODone (DESYREL) 50 MG tablet Take 1/2 to 1 Tablet by Mouth Nightly At Bedtime as Needed For Sleep 30 tablet 11     No current facility-administered medications for this visit.          Objective:    Vitals:    01/20/20 1632 01/20/20 1637 01/20/20 1656   BP: 146/84 160/84 154/88   Pulse: 69     Resp: 20     Temp: 98.9  F (37.2  C)     TempSrc: Oral     SpO2: 98%     Weight: 150 lb 4 oz (68.2 kg)     Height: 5' 2.99\" (1.6 m)         Gen:  NAD, VSS  Lungs:  normal  Heart:  normal          ADDITIONAL HISTORY SUMMARIZED (2): None.  DECISION TO OBTAIN EXTRA INFORMATION (1): None.   RADIOLOGY TESTS (1): None.  LABS (1): A1c.  MEDICINE TESTS (1): None.  INDEPENDENT REVIEW (2 each): None.     Total Data Points: 1    "

## 2021-06-09 NOTE — PROGRESS NOTES
"Jorge Bourne is a 65 y.o. male who is being evaluated via a billable telephone visit.      The patient has been notified of following:     \"This telephone visit will be conducted via a call between you and your physician/provider. We have found that certain health care needs can be provided without the need for a physical exam.  This service lets us provide the care you need with a short phone conversation.  If a prescription is necessary we can send it directly to your pharmacy.  If lab work is needed we can place an order for that and you can then stop by our lab to have the test done at a later time.    Telephone visits are billed at different rates depending on your insurance coverage. During this emergency period, for some insurers they may be billed the same as an in-person visit.  Please reach out to your insurance provider with any questions.    If during the course of the call the physician/provider feels a telephone visit is not appropriate, you will not be charged for this service.\"    Patient has given verbal consent to a Telephone visit? Yes    What phone number would you like to be contacted at? [phone]    Patient would like to receive their AVS by AVS Preference: Mail a copy.    Additional provider notes:     Taking metformin and glipizide.  No fever or cough.    Assessment/Plan:  1. Type 2 diabetes mellitus without complication, without long-term current use of insulin (H)    Recheck in 2 months.        Phone call duration:  6 minutes    Omar Chaney MD    "

## 2021-06-14 NOTE — TELEPHONE ENCOUNTER
Calling to remind patient to bring medications to today's appointment.  No answer.  Did not leave voice message as unsure that  speaks same language and not sure patient will retrieve message before appointment.

## 2021-06-15 NOTE — PROGRESS NOTES
"  Assessment & Plan     Jorge was seen today for medication check.    Diagnoses and all orders for this visit:    Type 2 diabetes mellitus without complication, without long-term current use of insulin (H)  -     Glycosylated Hemoglobin A1c  -     Microalbumin, Random Urine    Essential hypertension with goal blood pressure less than 140/90  -     Basic Metabolic Panel; Future  -     amLODIPine (NORVASC) 5 MG tablet; Take 1 tablet (5 mg total) by mouth daily.    Immunization due  -     Influenza, Recombinant, Inj, Quadrivalent, PF, 18+YRS    Mixed hyperlipidemia  -     Lipid Cascade FASTING; Future                       BMI:   Estimated body mass index is 28.52 kg/m  as calculated from the following:    Height as of this encounter: 5' 3\" (1.6 m).    Weight as of this encounter: 161 lb (73 kg).         Return in about 4 weeks (around 2/26/2021) for BP.    Omar Chaney MD  [facility] MARGARET Bourne is 65 y.o. and presents to clinic today for the following health issues   HPI     He has not needed his inhaler for 2 months.    Prior A1c was 12.4      Current Outpatient Medications   Medication Sig Dispense Refill     ACCU-CHEK GUIDE GLUCOSE METER Misc U UTD D  0     albuterol (PROAIR HFA;PROVENTIL HFA;VENTOLIN HFA) 90 mcg/actuation inhaler Inhale 2 puffs every 4 (four) hours as needed. 18 g 11     amLODIPine (NORVASC) 5 MG tablet Take 1 tablet (5 mg total) by mouth daily. 30 tablet 11     atorvastatin (LIPITOR) 20 MG tablet Take 1 tablet (20 mg total) by mouth daily. 30 tablet 11     blood glucose meter (GLUCOMETER) Use 1 each As Directed daily. Dispense glucometer brand per patient's insurance at pharmacy discretion. 1 each 0     blood glucose test strips Use 1 each As Directed daily. Dispense brand per patient's insurance at pharmacy discretion. 50 strip 11     generic lancets (FINGERSTIX LANCETS) Dispense brand per patient's insurance at pharmacy discretion. 50 each 11     " "glipiZIDE (GLUCOTROL XL) 5 MG 24 hr tablet Take 2 tablets (10 mg total) by mouth daily with breakfast. 60 tablet 11     lisinopriL (PRINIVIL,ZESTRIL) 20 MG tablet Take 1 tablet (20 mg total) by mouth daily. 30 tablet 11     metFORMIN (GLUCOPHAGE) 500 MG tablet Take 2 tablets (1,000 mg total) by mouth 2 (two) times a day with meals. 120 tablet 11     traZODone (DESYREL) 50 MG tablet Take 1/2 to 1 Tablet by Mouth Nightly At Bedtime as Needed For Sleep 30 tablet 11     No current facility-administered medications for this visit.          Review of Systems  No fever or cough.      Objective    /84   Pulse 73   Temp 98.3  F (36.8  C) (Oral)   Ht 5' 3\" (1.6 m)   Wt 161 lb (73 kg)   SpO2 99%   BMI 28.52 kg/m    Body mass index is 28.52 kg/m .  Physical Exam  Heart normal   Lungs normal    A1c 9.2              "

## 2021-06-15 NOTE — TELEPHONE ENCOUNTER
Called to schedule appt had to leave vm----- Message from Jesika Romero PharmD sent at 2/18/2021  9:37 AM CST -----  Hello,    Based on A1c, this patient would benefit from visiting with MTM pharmacist.  Can we reach out to schedule an initial visit with me for MTM? Based on their current insurance, a visit with me is fully covered and free of charge.     Please either schedule a Virtual a Medication Management TELE visit with me for 60 minutes on a Tuesday or Wednesday OR a Medication Management Initial visit on a Thursday or Friday.     Thank you  Abdullahi Romero, PharmD, BCACP

## 2021-06-15 NOTE — TELEPHONE ENCOUNTER
Abdullahi I have not been able to reach patient via phone would you like me to keep trying or send out letter?

## 2021-06-15 NOTE — PROGRESS NOTES
Assessment: /    Plan:    1. Type 2 diabetes mellitus without complication, without long-term current use of insulin  metFORMIN (GLUCOPHAGE) 500 MG tablet    glipiZIDE (GLUCOTROL XL) 5 MG 24 hr tablet   2. Essential hypertension with goal blood pressure less than 140/90  lisinopril (PRINIVIL,ZESTRIL) 5 MG tablet   3. Mixed hyperlipidemia  simvastatin (ZOCOR) 20 MG tablet    DISCONTINUED: simvastatin (ZOCOR) 20 MG tablet   4. Primary insomnia  traZODone (DESYREL) 50 MG tablet   5. Mild intermittent asthma without complication  albuterol (PROAIR HFA;PROVENTIL HFA;VENTOLIN HFA) 90 mcg/actuation inhaler   6. Need for vaccination  Influenza, Seasonal,Quad Inj, 36+ MOS       Recheck in 3 months.  Patient was seen with professional Desiree , Irene Willis.      Subjective:    HPI:  Jorge Bourne is a 62-year-old male presenting for follow-up on diabetes.  He has not had medications for several months because of no insurance.  He does have an inhaler still.      Review of Systems: No fever or cough.      Current Outpatient Prescriptions   Medication Sig Dispense Refill     albuterol (PROAIR HFA;PROVENTIL HFA;VENTOLIN HFA) 90 mcg/actuation inhaler Inhale 2 puffs every 4 (four) hours as needed. 18 g 11     glipiZIDE (GLUCOTROL XL) 5 MG 24 hr tablet Take 1 tablet (5 mg total) by mouth daily with breakfast. 30 tablet 11     lisinopril (PRINIVIL,ZESTRIL) 5 MG tablet Take 1 tablet (5 mg total) by mouth daily. 30 tablet 11     metFORMIN (GLUCOPHAGE) 500 MG tablet Take 1 tablet (500 mg total) by mouth 2 (two) times a day with meals. 60 tablet 11     simvastatin (ZOCOR) 20 MG tablet Take 1 tablet (20 mg total) by mouth at bedtime. 30 tablet 11     traZODone (DESYREL) 50 MG tablet Take 1/2 to 1 Tablet by Mouth Nightly At Bedtime as Needed For Sleep 30 tablet 11     No current facility-administered medications for this visit.          Objective:    Vitals:    01/09/18 1505 01/09/18 1510   BP: (!) 156/96 (!) 156/94   Patient Site:  "Right Arm Right Arm   Patient Position: Sitting Sitting   Cuff Size: Adult Large Adult Large   Pulse: 72    Resp: 19    Temp: 98  F (36.7  C)    TempSrc: Oral    SpO2: 97%    Weight: 149 lb 4 oz (67.7 kg)    Height: 5' 3\" (1.6 m)        Gen:  NAD, VSS  Lungs:  normal  Heart:  normal  Feet with normal monofilament testing, no ulcerations.    ACT = 22    ADDITIONAL HISTORY SUMMARIZED (2): None.  DECISION TO OBTAIN EXTRA INFORMATION (1): None.   RADIOLOGY TESTS (1): None.  LABS (1): None.  MEDICINE TESTS (1): None.  INDEPENDENT REVIEW (2 each): None.     Total Data Points: 0    "

## 2021-06-16 PROBLEM — F51.01 PRIMARY INSOMNIA: Status: ACTIVE | Noted: 2018-01-09

## 2021-06-16 PROBLEM — R41.3 MEMORY LOSS: Status: ACTIVE | Noted: 2019-10-18

## 2021-06-16 PROBLEM — E11.9 TYPE 2 DIABETES MELLITUS WITHOUT COMPLICATION, WITHOUT LONG-TERM CURRENT USE OF INSULIN (H): Status: ACTIVE | Noted: 2018-01-09

## 2021-06-16 NOTE — TELEPHONE ENCOUNTER
Called pt daughter-in-law and scheduled BP apt  For pt on 4/2/21 at 4 pm.      Daughter is requesting transportation with a Desiree .  If not possible, please call her and she will take him.  Thanks.

## 2021-06-16 NOTE — PROGRESS NOTES
"    Assessment & Plan     Jorge was seen today for hypertension.    Diagnoses and all orders for this visit:    Essential hypertension with goal blood pressure less than 140/90  -     amLODIPine (NORVASC) 10 MG tablet; Take 1 tablet (10 mg total) by mouth daily.      Increase amlodipine to 10 mg.             BMI:   Estimated body mass index is 28.61 kg/m  as calculated from the following:    Height as of this encounter: 5' 3\" (1.6 m).    Weight as of this encounter: 161 lb 8 oz (73.3 kg).       Return in about 2 months (around 6/2/2021) for BP.    Omar Chaney MD  [facility] MARGARET Bourne is 65 y.o. and presents today for the following health issues   HPI     Taking amlodipine 5 mg and lisinopril.      Current Outpatient Medications   Medication Sig Dispense Refill     ACCU-CHEK GUIDE GLUCOSE METER Misc U UTD D  0     albuterol (PROAIR HFA;PROVENTIL HFA;VENTOLIN HFA) 90 mcg/actuation inhaler Inhale 2 puffs every 4 (four) hours as needed. 18 g 11     amLODIPine (NORVASC) 10 MG tablet Take 1 tablet (10 mg total) by mouth daily. 30 tablet 11     atorvastatin (LIPITOR) 20 MG tablet Take 1 tablet (20 mg total) by mouth daily. 30 tablet 11     blood glucose meter (GLUCOMETER) Use 1 each As Directed daily. Dispense glucometer brand per patient's insurance at pharmacy discretion. 1 each 0     blood glucose test strips Use 1 each As Directed daily. Dispense brand per patient's insurance at pharmacy discretion. 50 strip 11     generic lancets (FINGERSTIX LANCETS) Dispense brand per patient's insurance at pharmacy discretion. 50 each 11     glipiZIDE (GLUCOTROL XL) 5 MG 24 hr tablet Take 2 tablets (10 mg total) by mouth daily with breakfast. 60 tablet 11     lisinopriL (PRINIVIL,ZESTRIL) 20 MG tablet Take 1 tablet (20 mg total) by mouth daily. 30 tablet 11     metFORMIN (GLUCOPHAGE) 500 MG tablet Take 2 tablets (1,000 mg total) by mouth 2 (two) times a day with meals. 120 tablet 11     " "traZODone (DESYREL) 50 MG tablet Take 1/2 to 1 Tablet by Mouth Nightly At Bedtime as Needed For Sleep 30 tablet 11     No current facility-administered medications for this visit.          Review of Systems  No fever or cough.      Objective    /80   Pulse 76   Temp 98.4  F (36.9  C) (Oral)   Ht 5' 3\" (1.6 m)   Wt 161 lb 8 oz (73.3 kg)   SpO2 98%   BMI 28.61 kg/m    Body mass index is 28.61 kg/m .  Physical Exam  Heart normal  Lungs normal              "

## 2021-06-16 NOTE — PROGRESS NOTES
Patient reports taking his medications regularly but is almost out of all.  Requests [facility] to call for all to be refilled for pickup.

## 2021-06-16 NOTE — TELEPHONE ENCOUNTER
Patient is in clinic for office visit, and almost out of all his medications.  Requesting staff to call for refills of all to be picked up.    Amlydopine, atorvastatin, glipizide, metformin, lisinopril, trazodone will be ready for pickup. Patient informed.

## 2021-06-17 NOTE — PROGRESS NOTES
"Assessment: /    Plan:    1. Type 2 diabetes mellitus without complication, without long-term current use of insulin  Comprehensive Metabolic Panel    Glycosylated Hemoglobin A1c   2. Mixed hyperlipidemia  LDL Cholesterol, Direct       Get refills every month.  Recheck in 3 months.  Patient was seen with professional Desiree , Naina Palumbo.      Subjective:    HPI:  Jorge Bourne is a 62-year-old male presenting for follow-up on diabetes.  He ran out of his medications 4 days ago.  He states that he filled his prescriptions in January, and then had one refill since then.    He uses Ventolin 4 times per month.      Review of Systems: No fever or cough.      Current Outpatient Prescriptions   Medication Sig Dispense Refill     albuterol (PROAIR HFA;PROVENTIL HFA;VENTOLIN HFA) 90 mcg/actuation inhaler Inhale 2 puffs every 4 (four) hours as needed. 18 g 11     glipiZIDE (GLUCOTROL XL) 5 MG 24 hr tablet Take 1 tablet (5 mg total) by mouth daily with breakfast. 30 tablet 11     lisinopril (PRINIVIL,ZESTRIL) 5 MG tablet Take 1 tablet (5 mg total) by mouth daily. 30 tablet 11     metFORMIN (GLUCOPHAGE) 500 MG tablet Take 1 tablet (500 mg total) by mouth 2 (two) times a day with meals. 60 tablet 11     simvastatin (ZOCOR) 20 MG tablet Take 1 tablet (20 mg total) by mouth at bedtime. 30 tablet 11     traZODone (DESYREL) 50 MG tablet Take 1/2 to 1 Tablet by Mouth Nightly At Bedtime as Needed For Sleep 30 tablet 11     No current facility-administered medications for this visit.          Objective:    Vitals:    04/10/18 1436 04/10/18 1440   BP: 136/90 138/84   Patient Site: Left Arm Left Arm   Patient Position: Sitting Sitting   Cuff Size: Adult Regular Adult Regular   Pulse: 76    Resp: 19    Temp: 98.6  F (37  C)    TempSrc: Oral    SpO2: 98%    Weight: 156 lb 12 oz (71.1 kg)    Height: 5' 3\" (1.6 m)        Gen:  NAD, VSS  Lungs:  normal  Heart:  normal          ADDITIONAL HISTORY SUMMARIZED (2): None.  DECISION TO " OBTAIN EXTRA INFORMATION (1): None.   RADIOLOGY TESTS (1): None.  LABS (1):  ordered.  MEDICINE TESTS (1): None.  INDEPENDENT REVIEW (2 each): None.     Total Data Points: 1

## 2021-06-19 NOTE — PROGRESS NOTES
Assessment: /    Plan:    1. Type 2 diabetes mellitus without complication, without long-term current use of insulin (H)  Glycosylated Hemoglobin A1c   2. Mixed hyperlipidemia  LDL Cholesterol, Direct   3. Essential hypertension with goal blood pressure less than 140/90  lisinopril (PRINIVIL,ZESTRIL) 10 MG tablet       Increase lisinopril from 5 mg to 10 mg.  Recheck in 5 weeks.  Patient was seen with professional Desiree persauderTiffany.      Subjective:    HPI:  Jorge Bourne is a 63-year-old male presenting for follow-up on diabetes.  He has been taking metformin.  He resumed his medications in April.      Review of Systems: No fever or cough.      Current Outpatient Prescriptions   Medication Sig Dispense Refill     albuterol (PROAIR HFA;PROVENTIL HFA;VENTOLIN HFA) 90 mcg/actuation inhaler Inhale 2 puffs every 4 (four) hours as needed. 18 g 11     glipiZIDE (GLUCOTROL XL) 5 MG 24 hr tablet Take 1 tablet (5 mg total) by mouth daily with breakfast. 30 tablet 11     metFORMIN (GLUCOPHAGE) 500 MG tablet Take 1 tablet (500 mg total) by mouth 2 (two) times a day with meals. 60 tablet 11     simvastatin (ZOCOR) 20 MG tablet Take 1 tablet (20 mg total) by mouth at bedtime. 30 tablet 11     traZODone (DESYREL) 50 MG tablet Take 1/2 to 1 Tablet by Mouth Nightly At Bedtime as Needed For Sleep 30 tablet 11     lisinopril (PRINIVIL,ZESTRIL) 10 MG tablet Take 1 tablet (10 mg total) by mouth daily. 30 tablet 11     No current facility-administered medications for this visit.          Objective:    Vitals:    07/13/18 1426   BP: 144/78   Pulse:    Resp:    Temp:    SpO2:        Gen:  NAD, VSS  Lungs:  normal  Heart:  normal          ADDITIONAL HISTORY SUMMARIZED (2): None.  DECISION TO OBTAIN EXTRA INFORMATION (1): None.   RADIOLOGY TESTS (1): None.  LABS (1): A1c, LDL.  MEDICINE TESTS (1): None.  INDEPENDENT REVIEW (2 each): None.     Total Data Points: 1

## 2021-06-19 NOTE — LETTER
Letter by Omar Chaney MD at      Author: Omar Chaney MD Service: -- Author Type: --    Filed:  Encounter Date: 9/9/2019 Status: (Other)        [facility] PATIENT ACCESS  [address]  [facility] [id number] [phone]         Jorge Bourne  [address]  [place] [address]        09/09/19    Dear Jorge Bourne     At [facility] we care about your health and well-being. Your primary care provider is committed to ensuring you receive high quality care and has chosen a network of specialists to assist in providing that care. Recently Dr. Chaney referred you to Diabetic Education  for specialty care.      Please call [facility] Endocrine at [phone] at your earliest convenience for assistance in scheduling an appointment.  If you have already scheduled this appointment, please disregard this notice.  Thank you for choosing [facility] for your healthcare needs.       Sincerely,       [facility] Specialty Scheduling

## 2021-06-19 NOTE — PROGRESS NOTES
Assessment: /    Plan:    1. Type 2 diabetes mellitus without complication, without long-term current use of insulin (H)  Microalbumin, Random Urine    metFORMIN (GLUCOPHAGE) 500 MG tablet   2. Mixed hyperlipidemia  atorvastatin (LIPITOR) 20 MG tablet   3. Essential hypertension with goal blood pressure less than 140/90  lisinopril (PRINIVIL,ZESTRIL) 20 MG tablet       Increase metformin to 2 tablets in the morning and 1 in the evening.  His big meal of the day is in the morning.    Increase lisinopril to 20 mg daily.    Change simvastatin to atorvastatin.    He declines cologuard.    Recheck in 6 weeks.  Patient was seen with professional Desiree , Briseida Bernstein.      Subjective:    HPI:  Jorge Bourne is a 63-year-old male presenting for follow-up on hypertension and diabetes.  A1c improved to 8.5 compared to 11.1.  He takes metformin 500 mg twice daily, and glipizide 5 mg daily.  LDL was 142.  He has been taking simvastatin 20 mg daily.    He has been taking lisinopril 10 mg for hypertension.      Review of Systems: No fever or cough.      Current Outpatient Prescriptions   Medication Sig Dispense Refill     albuterol (PROAIR HFA;PROVENTIL HFA;VENTOLIN HFA) 90 mcg/actuation inhaler Inhale 2 puffs every 4 (four) hours as needed. 18 g 11     glipiZIDE (GLUCOTROL XL) 5 MG 24 hr tablet Take 1 tablet (5 mg total) by mouth daily with breakfast. 30 tablet 11     metFORMIN (GLUCOPHAGE) 500 MG tablet Take 2 tablets each morning and 1 tablet each evening 90 tablet 11     traZODone (DESYREL) 50 MG tablet Take 1/2 to 1 Tablet by Mouth Nightly At Bedtime as Needed For Sleep 30 tablet 11     atorvastatin (LIPITOR) 20 MG tablet Take 1 tablet (20 mg total) by mouth daily. 30 tablet 11     lisinopril (PRINIVIL,ZESTRIL) 20 MG tablet Take 1 tablet (20 mg total) by mouth daily. 30 tablet 11     No current facility-administered medications for this visit.          Objective:    Vitals:    08/17/18 1337 08/17/18 1342   BP: 156/90  "144/90   Patient Site: Right Leg Left Arm   Patient Position: Sitting Sitting   Cuff Size: Adult Regular Adult Regular   Pulse: 71    Resp: 19    Temp: 98.5  F (36.9  C)    TempSrc: Oral    SpO2: 97%    Weight: 156 lb 4 oz (70.9 kg)    Height: 5' 3\" (1.6 m)        Gen:  NAD, VSS  Lungs:  normal  Heart:  normal          ADDITIONAL HISTORY SUMMARIZED (2): None.  DECISION TO OBTAIN EXTRA INFORMATION (1): None.   RADIOLOGY TESTS (1): None.  LABS (1): Reviewed.  MEDICINE TESTS (1): None.  INDEPENDENT REVIEW (2 each): None.     Total Data Points: 1    "

## 2021-06-19 NOTE — LETTER
Letter by Jesika Romero PharmD at      Author: Jesika Romero PharmD Service: -- Author Type: --    Filed:  Encounter Date: 11/22/2019 Status: Signed         Htun Tayla  [address]  [place] [address]                     November 22, 2019    Dear Jorge:    At the [facility], we care about you and your health. When diagnosed early, many diseases and illness can be treated and possibly prevented. That's why it is important to see your primary care provider regularly for routine examinations and to maintain your overall health.We are trying to contact you to ensure you receive the best level of care. Our records show that based on your A1C levels you would benefit in seeing our Clinic Pharmacist.  Please contact our office at [phone] to schedule an appointment.  If you are receiving care elsewhere, please contact us so that we can update our records.   Thank you for trusting us with your care.     If you have any questions or concerns, please don't hesitate to call.    Sincerely,        Electronically signed by Jesika Romero PharmD

## 2021-06-20 NOTE — PROGRESS NOTES
Assessment: /    Plan:    1. Essential hypertension with goal blood pressure less than 140/90     2. Need for vaccination  Influenza, Seasonal,Quad Inj, 36+ MOS       Recheck diabetes in 6 weeks.  Patient was seen with professional Desiree , Lesli Augustine.      Subjective:    HPI:  Jorge Bourne is a 63-year-old male presenting for follow-up on hypertension.  We increased lisinopril to 20 mg at his last visit.      Review of Systems: No fever or cough.      Current Outpatient Prescriptions   Medication Sig Dispense Refill     albuterol (PROAIR HFA;PROVENTIL HFA;VENTOLIN HFA) 90 mcg/actuation inhaler Inhale 2 puffs every 4 (four) hours as needed. 18 g 11     atorvastatin (LIPITOR) 20 MG tablet Take 1 tablet (20 mg total) by mouth daily. 30 tablet 11     glipiZIDE (GLUCOTROL XL) 5 MG 24 hr tablet Take 1 tablet (5 mg total) by mouth daily with breakfast. 30 tablet 11     lisinopril (PRINIVIL,ZESTRIL) 20 MG tablet Take 1 tablet (20 mg total) by mouth daily. 30 tablet 11     metFORMIN (GLUCOPHAGE) 500 MG tablet Take 2 tablets each morning and 1 tablet each evening 90 tablet 11     traZODone (DESYREL) 50 MG tablet Take 1/2 to 1 Tablet by Mouth Nightly At Bedtime as Needed For Sleep 30 tablet 11     No current facility-administered medications for this visit.          Objective:    Vitals:    09/28/18 1405   BP: 132/80   Pulse: 79   Resp: 19   Temp: 98.3  F (36.8  C)   SpO2: 96%       Gen:  NAD, VSS  Lungs:  normal  Heart:  normal          ADDITIONAL HISTORY SUMMARIZED (2): None.  DECISION TO OBTAIN EXTRA INFORMATION (1): None.   RADIOLOGY TESTS (1): None.  LABS (1): None.  MEDICINE TESTS (1): None.  INDEPENDENT REVIEW (2 each): None.     Total Data Points: 0

## 2021-06-21 NOTE — LETTER
Letter by Jesika Romero, PharmDORA at      Author: Jesika Romero, [place] Service: -- Author Type: --    Filed:  Encounter Date: 3/15/2021 Status: (Other)         Htun Tayla  [address] [place] [address]                     March 15, 2021    Dear Htun:    Based on your A1c levels you would benefit from visiting with the [facility] pharmacist.    Please call the clinic at [phone] to schedule.        Sincerely,        Electronically signed by Jesika Romero, [place]

## 2021-06-21 NOTE — PROGRESS NOTES
Assessment: /    Plan:    1. Type 2 diabetes mellitus without complication, without long-term current use of insulin (H)  Glycosylated Hemoglobin A1c    Ambulatory referral to Ophthalmology   2. Mixed hyperlipidemia  LDL Cholesterol, Direct       He had colon[place] in [place] in [place].    Recheck in 3 months.  Patient was seen with professional Desiree , Amandeep Bell.      Subjective:    HPI:  Jorge Bourne is a 63-year-old male presenting for follow-up on diabetes.  He takes glipizide and metformin.      Review of Systems: No fever, cough or chest pain.      Current Outpatient Medications   Medication Sig Dispense Refill     albuterol (PROAIR HFA;PROVENTIL HFA;VENTOLIN HFA) 90 mcg/actuation inhaler Inhale 2 puffs every 4 (four) hours as needed. 18 g 11     atorvastatin (LIPITOR) 20 MG tablet Take 1 tablet (20 mg total) by mouth daily. 30 tablet 11     glipiZIDE (GLUCOTROL XL) 5 MG 24 hr tablet Take 1 tablet (5 mg total) by mouth daily with breakfast. 30 tablet 11     lisinopril (PRINIVIL,ZESTRIL) 20 MG tablet Take 1 tablet (20 mg total) by mouth daily. 30 tablet 11     metFORMIN (GLUCOPHAGE) 500 MG tablet Take 2 tablets each morning and 1 tablet each evening 90 tablet 11     traZODone (DESYREL) 50 MG tablet Take 1/2 to 1 Tablet by Mouth Nightly At Bedtime as Needed For Sleep 30 tablet 11     No current facility-administered medications for this visit.          Objective:    Vitals:    11/09/18 1428   BP: 136/82   Pulse: 85   Resp: 19   Temp: 98.5  F (36.9  C)   SpO2: 97%       Gen:  NAD, VSS  Lungs:  normal  Heart:  normal          ADDITIONAL HISTORY SUMMARIZED (2): None.  DECISION TO OBTAIN EXTRA INFORMATION (1): None.   RADIOLOGY TESTS (1): None.  LABS (1): A1c, LDL.  MEDICINE TESTS (1): None.  INDEPENDENT REVIEW (2 each): None.     Total Data Points: 1

## 2021-06-24 NOTE — PROGRESS NOTES
Assessment: /    Plan:    1. Type 2 diabetes mellitus without complication, without long-term current use of insulin (H)  glipiZIDE (GLUCOTROL XL) 5 MG 24 hr tablet    metFORMIN (GLUCOPHAGE) 500 MG tablet    Glycosylated Hemoglobin A1c   2. Mild intermittent asthma without complication     3. Mixed hyperlipidemia  atorvastatin (LIPITOR) 20 MG tablet   4. Essential hypertension with goal blood pressure less than 140/90  lisinopril (PRINIVIL,ZESTRIL) 20 MG tablet   5. Primary insomnia  traZODone (DESYREL) 50 MG tablet       Increase glipizide to 10 mg.    ACT = 25    Recheck in 3 months.  Patient was seen with professional Desiree , Lesli Augustine.      Subjective:    HPI:  Jorge Bourne is a 63-year-old male presenting for follow-up on diabetes and asthma.  He has been out of his usual medications.    Social Hx: His sister drove him to the appointment.  She is not usually available to provide transportation for him.    Review of Systems: No fever or cough.      Current Outpatient Medications   Medication Sig Dispense Refill     albuterol (PROAIR HFA;PROVENTIL HFA;VENTOLIN HFA) 90 mcg/actuation inhaler Inhale 2 puffs every 4 (four) hours as needed. 18 g 11     atorvastatin (LIPITOR) 20 MG tablet Take 1 tablet (20 mg total) by mouth daily. 30 tablet 11     glipiZIDE (GLUCOTROL XL) 5 MG 24 hr tablet Take 2 tablets (10 mg total) by mouth daily with breakfast. 60 tablet 11     lisinopril (PRINIVIL,ZESTRIL) 20 MG tablet Take 1 tablet (20 mg total) by mouth daily. 30 tablet 11     metFORMIN (GLUCOPHAGE) 500 MG tablet Take 2 tablets each morning and 1 tablet each evening 90 tablet 11     traZODone (DESYREL) 50 MG tablet Take 1/2 to 1 Tablet by Mouth Nightly At Bedtime as Needed For Sleep 30 tablet 11     No current facility-administered medications for this visit.          Objective:    Vitals:    02/15/19 1409   BP: 138/80   Pulse: 82   Resp: 19   Temp: 98.7  F (37.1  C)   SpO2: 96%       Gen:  NAD, VSS  Lungs:   normal  Heart:  normal  Abdomen:  No HSM, mass or tenderness        ADDITIONAL HISTORY SUMMARIZED (2): None.  DECISION TO OBTAIN EXTRA INFORMATION (1): None.   RADIOLOGY TESTS (1): None.  LABS (1): A1c.  MEDICINE TESTS (1): None.  INDEPENDENT REVIEW (2 each): None.     Total Data Points: 1

## 2021-06-26 NOTE — PROGRESS NOTES
"    Assessment & Plan     Jorge was seen today for diabetes and hypertension.    Diagnoses and all orders for this visit:    Type 2 diabetes mellitus without complication, without long-term current use of insulin (H)  -     Glycosylated Hemoglobin A1c      Decrease rice intake.             BMI:   Estimated body mass index is 28.48 kg/m  as calculated from the following:    Height as of this encounter: 5' 2.8\" (1.595 m).    Weight as of this encounter: 159 lb 12 oz (72.5 kg).       Return in about 4 months (around 10/4/2021) for DM.    Omar Chaney MD  [facility] MARGARET Bourne is 66 y.o. and presents today for the following health issues   HPI     Taking metformin.      Current Outpatient Medications   Medication Sig Dispense Refill     ACCU-CHEK GUIDE GLUCOSE METER Misc U UTD D  0     albuterol (PROAIR HFA;PROVENTIL HFA;VENTOLIN HFA) 90 mcg/actuation inhaler Inhale 2 puffs every 4 (four) hours as needed. 18 g 11     amLODIPine (NORVASC) 10 MG tablet Take 1 tablet (10 mg total) by mouth daily. 30 tablet 11     atorvastatin (LIPITOR) 20 MG tablet Take 1 tablet (20 mg total) by mouth daily. 30 tablet 11     blood glucose meter (GLUCOMETER) Use 1 each As Directed daily. Dispense glucometer brand per patient's insurance at pharmacy discretion. 1 each 0     blood glucose test strips Use 1 each As Directed daily. Dispense brand per patient's insurance at pharmacy discretion. 50 strip 11     generic lancets (FINGERSTIX LANCETS) Dispense brand per patient's insurance at pharmacy discretion. 50 each 11     glipiZIDE (GLUCOTROL XL) 5 MG 24 hr tablet Take 2 tablets (10 mg total) by mouth daily with breakfast. 60 tablet 11     lisinopriL (PRINIVIL,ZESTRIL) 20 MG tablet Take 1 tablet (20 mg total) by mouth daily. 30 tablet 11     metFORMIN (GLUCOPHAGE) 500 MG tablet Take 2 tablets (1,000 mg total) by mouth 2 (two) times a day with meals. 120 tablet 11     traZODone (DESYREL) 50 MG tablet " "Take 1/2 to 1 Tablet by Mouth Nightly At Bedtime as Needed For Sleep 30 tablet 11     No current facility-administered medications for this visit.          Review of Systems  No fever or cough.      Objective    /86   Pulse 80   Temp 99.2  F (37.3  C) (Oral)   Ht 5' 2.8\" (1.595 m)   Wt 159 lb 12 oz (72.5 kg)   SpO2 95%   BMI 28.48 kg/m    Body mass index is 28.48 kg/m .  Physical Exam  Heart normal  Lungs normal    A1c = 9.6              "

## 2021-07-06 VITALS
OXYGEN SATURATION: 95 % | SYSTOLIC BLOOD PRESSURE: 160 MMHG | BODY MASS INDEX: 28.3 KG/M2 | HEART RATE: 80 BPM | WEIGHT: 159.75 LBS | HEIGHT: 63 IN | TEMPERATURE: 99.2 F | DIASTOLIC BLOOD PRESSURE: 86 MMHG

## 2021-10-04 ENCOUNTER — OFFICE VISIT (OUTPATIENT)
Dept: FAMILY MEDICINE | Facility: CLINIC | Age: 66
End: 2021-10-04
Payer: COMMERCIAL

## 2021-10-04 VITALS
HEIGHT: 63 IN | TEMPERATURE: 98.1 F | HEART RATE: 73 BPM | BODY MASS INDEX: 27.6 KG/M2 | OXYGEN SATURATION: 98 % | DIASTOLIC BLOOD PRESSURE: 90 MMHG | SYSTOLIC BLOOD PRESSURE: 168 MMHG | WEIGHT: 155.75 LBS | RESPIRATION RATE: 16 BRPM

## 2021-10-04 DIAGNOSIS — F51.01 PRIMARY INSOMNIA: ICD-10-CM

## 2021-10-04 DIAGNOSIS — E78.2 MIXED HYPERLIPIDEMIA: ICD-10-CM

## 2021-10-04 DIAGNOSIS — I10 ESSENTIAL HYPERTENSION WITH GOAL BLOOD PRESSURE LESS THAN 140/90: ICD-10-CM

## 2021-10-04 DIAGNOSIS — E11.9 TYPE 2 DIABETES MELLITUS WITHOUT COMPLICATION, WITHOUT LONG-TERM CURRENT USE OF INSULIN (H): Primary | ICD-10-CM

## 2021-10-04 DIAGNOSIS — J45.20 MILD INTERMITTENT ASTHMA WITHOUT COMPLICATION: ICD-10-CM

## 2021-10-04 LAB
HBA1C MFR BLD: 11.4 % (ref 0–5.6)
HOLD SPECIMEN: NORMAL

## 2021-10-04 PROCEDURE — 99213 OFFICE O/P EST LOW 20 MIN: CPT | Performed by: FAMILY MEDICINE

## 2021-10-04 PROCEDURE — 36415 COLL VENOUS BLD VENIPUNCTURE: CPT | Performed by: FAMILY MEDICINE

## 2021-10-04 PROCEDURE — 83036 HEMOGLOBIN GLYCOSYLATED A1C: CPT | Performed by: FAMILY MEDICINE

## 2021-10-04 RX ORDER — ALBUTEROL SULFATE 90 UG/1
2 AEROSOL, METERED RESPIRATORY (INHALATION) EVERY 4 HOURS PRN
Qty: 18 G | Refills: 11 | Status: SHIPPED | OUTPATIENT
Start: 2021-10-04 | End: 2023-08-18

## 2021-10-04 RX ORDER — TRAZODONE HYDROCHLORIDE 50 MG/1
TABLET, FILM COATED ORAL
Qty: 30 TABLET | Refills: 11 | Status: SHIPPED | OUTPATIENT
Start: 2021-10-04 | End: 2022-02-22

## 2021-10-04 RX ORDER — ATORVASTATIN CALCIUM 20 MG/1
20 TABLET, FILM COATED ORAL DAILY
Qty: 30 TABLET | Refills: 11 | Status: SHIPPED | OUTPATIENT
Start: 2021-10-04 | End: 2022-08-22

## 2021-10-04 RX ORDER — GLIPIZIDE 5 MG/1
10 TABLET, FILM COATED, EXTENDED RELEASE ORAL
Qty: 60 TABLET | Refills: 11 | Status: SHIPPED | OUTPATIENT
Start: 2021-10-04 | End: 2022-08-22

## 2021-10-04 RX ORDER — AMLODIPINE BESYLATE 10 MG/1
10 TABLET ORAL DAILY
Qty: 30 TABLET | Refills: 11 | Status: SHIPPED | OUTPATIENT
Start: 2021-10-04 | End: 2023-03-31

## 2021-10-04 RX ORDER — LISINOPRIL 20 MG/1
20 TABLET ORAL DAILY
Qty: 30 TABLET | Refills: 11 | Status: SHIPPED | OUTPATIENT
Start: 2021-10-04 | End: 2022-02-22

## 2021-10-04 ASSESSMENT — MIFFLIN-ST. JEOR: SCORE: 1388.43

## 2021-10-10 NOTE — PROGRESS NOTES
"    Assessment & Plan     Type 2 diabetes mellitus without complication, without long-term current use of insulin (H)    - Extra Tube  - Hemoglobin A1c  - Hemoglobin A1c  - glipiZIDE (GLUCOTROL XL) 5 MG 24 hr tablet  Dispense: 60 tablet; Refill: 11  - metFORMIN (GLUCOPHAGE) 500 MG tablet  Dispense: 120 tablet; Refill: 11  - Extra Tube    Mild intermittent asthma without complication    - albuterol (PROAIR HFA/PROVENTIL HFA/VENTOLIN HFA) 108 (90 Base) MCG/ACT inhaler  Dispense: 18 g; Refill: 11    Essential hypertension with goal blood pressure less than 140/90    - amLODIPine (NORVASC) 10 MG tablet  Dispense: 30 tablet; Refill: 11  - lisinopril (ZESTRIL) 20 MG tablet  Dispense: 30 tablet; Refill: 11    Mixed hyperlipidemia    - atorvastatin (LIPITOR) 20 MG tablet  Dispense: 30 tablet; Refill: 11    Primary insomnia    - traZODone (DESYREL) 50 MG tablet  Dispense: 30 tablet; Refill: 11    Delivery requested.    Recheck in 1 month.             BMI:   Estimated body mass index is 27.77 kg/m  as calculated from the following:    Height as of this encounter: 1.595 m (5' 2.8\").    Weight as of this encounter: 70.6 kg (155 lb 12 oz).           Return in about 4 weeks (around 11/1/2021) for Follow up.    mOar Chaney MD, MD  [facility] MARGARET Patel is a 66 year old who presents for the following health issues     HPI     He states that he has not been taking any medication for more than 3 months, because his children say they are too busy to take him to the pharmacy.  He requests that prescriptions be sent to a pharmacy that will deliver.    Current Outpatient Medications   Medication Sig Dispense Refill     albuterol (PROAIR HFA/PROVENTIL HFA/VENTOLIN HFA) 108 (90 Base) MCG/ACT inhaler Inhale 2 puffs into the lungs every 4 hours as needed 18 g 11     amLODIPine (NORVASC) 10 MG tablet Take 1 tablet (10 mg) by mouth daily 30 tablet 11     atorvastatin (LIPITOR) 20 MG tablet Take 1 tablet " "(20 mg) by mouth daily 30 tablet 11     glipiZIDE (GLUCOTROL XL) 5 MG 24 hr tablet Take 2 tablets (10 mg) by mouth daily (with breakfast) 60 tablet 11     lisinopril (ZESTRIL) 20 MG tablet Take 1 tablet (20 mg) by mouth daily 30 tablet 11     metFORMIN (GLUCOPHAGE) 500 MG tablet Take 2 tablets (1,000 mg) by mouth 2 times daily (with meals) 120 tablet 11     traZODone (DESYREL) 50 MG tablet [TRAZODONE (DESYREL) 50 MG TABLET] Take 1/2 to 1 Tablet by Mouth Nightly At Bedtime as Needed For Sleep 30 tablet 11     ACCU-CHEK GUIDE GLUCOSE METER Misc [ACCU-CHEK GUIDE GLUCOSE METER MISC] U UTD D (Patient not taking: Reported on 10/4/2021)  0     blood glucose meter (GLUCOMETER) [BLOOD GLUCOSE METER (GLUCOMETER)] Use 1 each As Directed daily. Dispense glucometer brand per patient's insurance at pharmacy discretion. (Patient not taking: Reported on 10/4/2021) 1 each 0     blood glucose test strips [BLOOD GLUCOSE TEST STRIPS] Use 1 each As Directed daily. Dispense brand per patient's insurance at pharmacy discretion. (Patient not taking: Reported on 10/4/2021) 50 strip 11     generic lancets (FINGERSTIX LANCETS) [GENERIC LANCETS (FINGERSTIX LANCETS)] Dispense brand per patient's insurance at pharmacy discretion. (Patient not taking: Reported on 10/4/2021) 50 each 11         Review of Systems   No fever or cough.      Objective    BP (!) 168/90   Pulse 73   Temp 98.1  F (36.7  C) (Oral)   Resp 16   Ht 1.595 m (5' 2.8\")   Wt 70.6 kg (155 lb 12 oz)   SpO2 98%   BMI 27.77 kg/m    Body mass index is 27.77 kg/m .  Physical Exam   Heart normal  Lungs normal    A1c 11.4, had been 9.6 in June "

## 2021-11-17 ENCOUNTER — TELEPHONE (OUTPATIENT)
Dept: FAMILY MEDICINE | Facility: CLINIC | Age: 66
End: 2021-11-17
Payer: COMMERCIAL

## 2021-11-17 NOTE — TELEPHONE ENCOUNTER
Provider note 10/4/21: Return in about 4 weeks (around 11/1/2021) for Follow up.  Patient NS for 11/8/21.  Calling to reschedule.    Left voice message that [facility] clinic ([phone]) is calling to reschedule the  appointment missed on 11/08.

## 2022-02-22 DIAGNOSIS — Z76.0 ENCOUNTER FOR MEDICATION REFILL: Primary | ICD-10-CM

## 2022-02-22 DIAGNOSIS — F51.01 PRIMARY INSOMNIA: ICD-10-CM

## 2022-02-22 DIAGNOSIS — I10 ESSENTIAL HYPERTENSION WITH GOAL BLOOD PRESSURE LESS THAN 140/90: ICD-10-CM

## 2022-02-22 DIAGNOSIS — E11.9 TYPE 2 DIABETES MELLITUS WITHOUT COMPLICATION, WITHOUT LONG-TERM CURRENT USE OF INSULIN (H): ICD-10-CM

## 2022-02-23 RX ORDER — TRAZODONE HYDROCHLORIDE 50 MG/1
TABLET, FILM COATED ORAL
Qty: 30 TABLET | Refills: 1 | Status: SHIPPED | OUTPATIENT
Start: 2022-02-23 | End: 2023-03-31

## 2022-02-23 RX ORDER — LISINOPRIL 20 MG/1
TABLET ORAL
Qty: 90 TABLET | Refills: 3 | Status: SHIPPED | OUTPATIENT
Start: 2022-02-23 | End: 2022-08-22

## 2022-07-15 ENCOUNTER — PATIENT OUTREACH (OUTPATIENT)
Dept: GERIATRIC MEDICINE | Facility: CLINIC | Age: 67
End: 2022-07-15

## 2022-07-15 NOTE — PROGRESS NOTES
[facility] Care Coordination Contact    Member became effective with [facility] on 7/1/2022 with Nicole MSC+.  Previous Health Plan: MA/Fee For Service  Previous Care System: NA  Previous care coordinators name and number: NA  Waiver Type: N/A  No MMIS entries for member   UTF received: No UTF to request   WL sent.    Tiera Sarabia  Care Management Specialist   [facility]   [phone]

## 2022-07-15 NOTE — LETTER
July 15, 2022    JORGE AUSTIN  [address] [place] [address]    Dear  Jorge,    Welcome to [facility] s [place] [facility] ([facility] health program. My name is MT Suh. I am your [facility] care coordinator. You are eligible for Care Coordination through [facility]+ Product.    Here is how Care Coordination works:    I will meet with you in person to determine your care coordination needs    We will develop a plan of care to meet your needs    We will create a service plan showing the services you will receive    We will talk about and coordinate any preventive care needs you have    I will call you soon to see how you are doing and determine what needs you may have. Our goal is to keep you as healthy and independent as possible.     Soon, you will receive a new [facility] member identification (ID) card from [facility]. When you receive it, please use this card along with your [place] Health Care Programs card and Prescription Drug Coverage Program card. When you receive, it please use this card where you get your health services. If you have Medicare, you will need to show your Medicare card when you get health services.    Being in the [place] [facility] ([facility]+) Care Coordination program is voluntary and offered to you at no cost. If you ever wish to stop being in the Care Coordination program or have questions, call me at [phone]. If you reach my voice mail, leave a message and your phone number. If you are hearing impaired, please call the [place] Relay at 258 or [phone] (cxuoay-cl-rjyrcb relay service).    Sincerely,      MT Suh  [phone]  [email or web address]              [id number] accepted   (12/2019)

## 2022-07-21 ENCOUNTER — PATIENT OUTREACH (OUTPATIENT)
Dept: GERIATRIC MEDICINE | Facility: CLINIC | Age: 67
End: 2022-07-21

## 2022-07-22 ASSESSMENT — ACTIVITIES OF DAILY LIVING (ADL): DEPENDENT_IADLS:: INDEPENDENT

## 2022-07-22 NOTE — PROGRESS NOTES
[facility] Initial Assessment     Home visit for Initial Health Risk Assessment with Jorge Bourne completed on July 21, 2022       Current living arrangement:: I live in a private home with family     Assessment completed with:: Patient    Current Care Plan  Member currently receiving the following home care services:     Member currently receiving the following community resources: None      Medication Review  Medication reconciliation completed in Epic: Yes  Medication set-up & administration: Independent and sets up on own daily.  Self-administers medications.  Medication Risk Assessment Medication (1 or more, place referral to MTM): N/A: No risk factors identified  MTM Referral Placed: No: No risk factors idenified    Mental/Behavioral Health   Depression Screening:   PHQ-2 Total Score (Adult) - Positive if 3 or more points; Administer PHQ-9 if positive: 0                No current  services-will place referral for NA    Falls Assessment:   Fallen 2 or more times in the past year?: No   Any fall with injury in the past year?: No    ADL/IADL Dependencies:   Dependent ADLs:: Independent  Dependent IADLs:: Independent    [facility] sponsored benefits: Shared information re: Silver [email or web address] memberships, ASA, Calcium +D.    PCA Assessment completed at visit: Not Applicable     Elderly Waiver Eligibility: No-does not meet criteria    Care Plan & Recommendations: Member will continue working and living independently in his family home.    See LTCC for detailed assessment information.    Follow-Up Plan: Member informed of future contact, plan to f/u with member with a 6 month telephone assessment.  Contact information shared with member and family, encouraged member to call with any questions or concerns at any time.    [facility] care continuum providers: Please see Snapshot and Care Management Flowsheets for Specific details of care plan.    This CC note routed to PCP.     Skye Machuca  VERA  [facility]  [phone]

## 2022-08-18 ENCOUNTER — PATIENT OUTREACH (OUTPATIENT)
Dept: GERIATRIC MEDICINE | Facility: CLINIC | Age: 67
End: 2022-08-18

## 2022-08-18 NOTE — PROGRESS NOTES
[facility] Care Coordination Contact    Received after visit chart from care coordinator.  Completed following tasks: Mailed copy of care plan to client, Mailed copy of POC signature sheet for member to sign and return in SASE  and Mailed are Safe Medication Disposal .    Omega Ho  Care Management Specialist  [facility]  [phone]

## 2022-08-18 NOTE — LETTER
August 18, 2022    JORGE AUSTIN  [address] [place] [address]        Dear Jorge:    At [facility], we are dedicated to improving your health and well-being. Enclosed is the Comprehensive Care Plan that we developed with you on 7/21/22. Please review the Care Plan carefully.    As a reminder, some of the things we discussed at your visit include:    Your physical and mental health    Ways to reduce falls    Health care needs you may have    Don t forget to contact your care coordinator if you:    Have been hospitalized or plan to be hospitalized     Have had a fall     Have experienced a change in physical health    Are experiencing emotional problems     If you do not agree with your Care Plan, have questions about it, or have experienced a change in your needs, please call me at [phone]. If you are hearing impaired, please call the [place] Relay at 378 or [phone] (wjaczk-ze-jibttt relay service).    Sincerely,        MT Suh  [phone]  [email or web address]              [facility]+[id number] [place] ([id number])     [id number] (11/18)

## 2022-08-19 ENCOUNTER — PATIENT OUTREACH (OUTPATIENT)
Dept: GERIATRIC MEDICINE | Facility: CLINIC | Age: 67
End: 2022-08-19

## 2022-08-22 ENCOUNTER — OFFICE VISIT (OUTPATIENT)
Dept: FAMILY MEDICINE | Facility: CLINIC | Age: 67
End: 2022-08-22
Payer: COMMERCIAL

## 2022-08-22 VITALS
OXYGEN SATURATION: 98 % | DIASTOLIC BLOOD PRESSURE: 76 MMHG | HEIGHT: 62 IN | WEIGHT: 146.75 LBS | RESPIRATION RATE: 16 BRPM | SYSTOLIC BLOOD PRESSURE: 138 MMHG | BODY MASS INDEX: 27 KG/M2 | HEART RATE: 75 BPM | TEMPERATURE: 97.8 F

## 2022-08-22 DIAGNOSIS — I10 ESSENTIAL HYPERTENSION WITH GOAL BLOOD PRESSURE LESS THAN 140/90: ICD-10-CM

## 2022-08-22 DIAGNOSIS — E78.2 MIXED HYPERLIPIDEMIA: ICD-10-CM

## 2022-08-22 DIAGNOSIS — Z23 NEED FOR VACCINATION: ICD-10-CM

## 2022-08-22 DIAGNOSIS — E11.9 TYPE 2 DIABETES MELLITUS WITHOUT COMPLICATION, WITHOUT LONG-TERM CURRENT USE OF INSULIN (H): Primary | ICD-10-CM

## 2022-08-22 DIAGNOSIS — Z23 NEED FOR COVID-19 VACCINE: ICD-10-CM

## 2022-08-22 LAB
ALBUMIN SERPL BCG-MCNC: 4.8 G/DL (ref 3.5–5.2)
ALP SERPL-CCNC: 76 U/L (ref 40–129)
ALT SERPL W P-5'-P-CCNC: 8 U/L (ref 10–50)
ANION GAP SERPL CALCULATED.3IONS-SCNC: 12 MMOL/L (ref 7–15)
AST SERPL W P-5'-P-CCNC: 20 U/L (ref 10–50)
BILIRUB SERPL-MCNC: 0.3 MG/DL
BUN SERPL-MCNC: 22.8 MG/DL (ref 8–23)
CALCIUM SERPL-MCNC: 10.5 MG/DL (ref 8.8–10.2)
CHLORIDE SERPL-SCNC: 92 MMOL/L (ref 98–107)
CHOLEST SERPL-MCNC: 266 MG/DL
CREAT SERPL-MCNC: 0.71 MG/DL (ref 0.67–1.17)
CREAT UR-MCNC: 43 MG/DL
DEPRECATED HCO3 PLAS-SCNC: 26 MMOL/L (ref 22–29)
GFR SERPL CREATININE-BSD FRML MDRD: >90 ML/MIN/1.73M2
GLUCOSE SERPL-MCNC: 426 MG/DL (ref 70–99)
HBA1C MFR BLD: 14.6 % (ref 0–5.6)
HDLC SERPL-MCNC: 60 MG/DL
LDLC SERPL CALC-MCNC: 146 MG/DL
MICROALBUMIN UR-MCNC: <12 MG/L
MICROALBUMIN/CREAT UR: NORMAL MG/G{CREAT}
NONHDLC SERPL-MCNC: 206 MG/DL
POTASSIUM SERPL-SCNC: 5 MMOL/L (ref 3.4–5.3)
PROT SERPL-MCNC: 7.9 G/DL (ref 6.4–8.3)
SODIUM SERPL-SCNC: 130 MMOL/L (ref 136–145)
TRIGL SERPL-MCNC: 300 MG/DL

## 2022-08-22 PROCEDURE — 82043 UR ALBUMIN QUANTITATIVE: CPT | Performed by: FAMILY MEDICINE

## 2022-08-22 PROCEDURE — 99214 OFFICE O/P EST MOD 30 MIN: CPT | Mod: 25 | Performed by: FAMILY MEDICINE

## 2022-08-22 PROCEDURE — 91306 COVID-19,PF,MODERNA (18+ YRS BOOSTER .25ML): CPT | Performed by: FAMILY MEDICINE

## 2022-08-22 PROCEDURE — 0064A COVID-19,PF,MODERNA (18+ YRS BOOSTER .25ML): CPT | Performed by: FAMILY MEDICINE

## 2022-08-22 PROCEDURE — 90471 IMMUNIZATION ADMIN: CPT | Performed by: FAMILY MEDICINE

## 2022-08-22 PROCEDURE — 83036 HEMOGLOBIN GLYCOSYLATED A1C: CPT | Performed by: FAMILY MEDICINE

## 2022-08-22 PROCEDURE — 90732 PPSV23 VACC 2 YRS+ SUBQ/IM: CPT | Performed by: FAMILY MEDICINE

## 2022-08-22 PROCEDURE — 80061 LIPID PANEL: CPT | Performed by: FAMILY MEDICINE

## 2022-08-22 PROCEDURE — 80053 COMPREHEN METABOLIC PANEL: CPT | Performed by: FAMILY MEDICINE

## 2022-08-22 PROCEDURE — 36415 COLL VENOUS BLD VENIPUNCTURE: CPT | Performed by: FAMILY MEDICINE

## 2022-08-22 RX ORDER — GLIPIZIDE 5 MG/1
10 TABLET, FILM COATED, EXTENDED RELEASE ORAL
Qty: 60 TABLET | Refills: 11 | Status: SHIPPED | OUTPATIENT
Start: 2022-08-22 | End: 2023-08-18

## 2022-08-22 RX ORDER — LISINOPRIL 20 MG/1
20 TABLET ORAL DAILY
Qty: 30 TABLET | Refills: 11 | Status: SHIPPED | OUTPATIENT
Start: 2022-08-22 | End: 2023-03-31

## 2022-08-22 RX ORDER — ATORVASTATIN CALCIUM 20 MG/1
20 TABLET, FILM COATED ORAL DAILY
Qty: 30 TABLET | Refills: 11 | Status: SHIPPED | OUTPATIENT
Start: 2022-08-22 | End: 2023-08-18

## 2022-08-22 ASSESSMENT — ASTHMA QUESTIONNAIRES
ACT_TOTALSCORE: 25
ACT_TOTALSCORE: 25
QUESTION_2 LAST FOUR WEEKS HOW OFTEN HAVE YOU HAD SHORTNESS OF BREATH: NOT AT ALL
QUESTION_3 LAST FOUR WEEKS HOW OFTEN DID YOUR ASTHMA SYMPTOMS (WHEEZING, COUGHING, SHORTNESS OF BREATH, CHEST TIGHTNESS OR PAIN) WAKE YOU UP AT NIGHT OR EARLIER THAN USUAL IN THE MORNING: NOT AT ALL
QUESTION_1 LAST FOUR WEEKS HOW MUCH OF THE TIME DID YOUR ASTHMA KEEP YOU FROM GETTING AS MUCH DONE AT WORK, SCHOOL OR AT HOME: NONE OF THE TIME
QUESTION_3 LAST FOUR WEEKS HOW OFTEN DID YOUR ASTHMA SYMPTOMS (WHEEZING, COUGHING, SHORTNESS OF BREATH, CHEST TIGHTNESS OR PAIN) WAKE YOU UP AT NIGHT OR EARLIER THAN USUAL IN THE MORNING: NOT AT ALL
QUESTION_4 LAST FOUR WEEKS HOW OFTEN HAVE YOU USED YOUR RESCUE INHALER OR NEBULIZER MEDICATION (SUCH AS ALBUTEROL): NOT AT ALL
QUESTION_5 LAST FOUR WEEKS HOW WOULD YOU RATE YOUR ASTHMA CONTROL: COMPLETELY CONTROLLED
ACT_TOTALSCORE: 25
QUESTION_2 LAST FOUR WEEKS HOW OFTEN HAVE YOU HAD SHORTNESS OF BREATH: NOT AT ALL
QUESTION_5 LAST FOUR WEEKS HOW WOULD YOU RATE YOUR ASTHMA CONTROL: COMPLETELY CONTROLLED
QUESTION_4 LAST FOUR WEEKS HOW OFTEN HAVE YOU USED YOUR RESCUE INHALER OR NEBULIZER MEDICATION (SUCH AS ALBUTEROL): NOT AT ALL
QUESTION_1 LAST FOUR WEEKS HOW MUCH OF THE TIME DID YOUR ASTHMA KEEP YOU FROM GETTING AS MUCH DONE AT WORK, SCHOOL OR AT HOME: NONE OF THE TIME

## 2022-08-22 NOTE — PROGRESS NOTES
"  Assessment & Plan     Type 2 diabetes mellitus without complication, without long-term current use of insulin (H)    - Hemoglobin A1c  - Comprehensive metabolic panel (BMP + Alb, Alk Phos, ALT, AST, Total. Bili, TP)  - Albumin Random Urine Quantitative with Creat Ratio  - Hemoglobin A1c  - Comprehensive metabolic panel (BMP + Alb, Alk Phos, ALT, AST, Total. Bili, TP)  - metFORMIN (GLUCOPHAGE) 500 MG tablet  Dispense: 120 tablet; Refill: 11  - glipiZIDE (GLUCOTROL XL) 5 MG 24 hr tablet  Dispense: 60 tablet; Refill: 11  - blood glucose monitoring (NO BRAND SPECIFIED) meter device kit  Dispense: 1 kit; Refill: 0  - blood glucose (NO BRAND SPECIFIED) test strip  Dispense: 50 strip; Refill: 11  - blood glucose (NO BRAND SPECIFIED) lancets standard  Dispense: 50 each; Refill: 11  - Albumin Random Urine Quantitative with Creat Ratio    Essential hypertension with goal blood pressure less than 140/90    - lisinopril (ZESTRIL) 20 MG tablet  Dispense: 30 tablet; Refill: 11    Mixed hyperlipidemia    - Lipid panel reflex to direct LDL Non-fasting  - Lipid panel reflex to direct LDL Non-fasting  - atorvastatin (LIPITOR) 20 MG tablet  Dispense: 30 tablet; Refill: 11    Need for vaccination    - Pneumococcal vaccine 23 valent PPSV23  (Pneumovax)    Need for COVID-19 vaccine    - COVID-19,PF,MODERNA (18+ YRS BOOSTER .25ML)        36 minutes spent on the date of the encounter doing chart review, review of test results and patient visit regarding diabetes, HTN, HLD.       BMI:   Estimated body mass index is 26.84 kg/m  as calculated from the following:    Height as of this encounter: 1.575 m (5' 2\").    Weight as of this encounter: 66.6 kg (146 lb 12 oz).           Return in about 3 months (around 11/22/2022) for DM.    Omar Chaney MD, MD  [facility] RAYRAYKELLY Mims   un is a 67 year old, presenting for the following health issues:  Diabetes (Has not been taking meds or checking BS since he didn't have " active insurance.)      History of Present Illness       Diabetes:   He presents for follow up of diabetes.  He is not checking blood glucose. He has no concerns regarding his diabetes at this time.  He is not experiencing numbness or burning in feet, excessive thirst, blurry vision, weight changes or redness, sores or blisters on feet. The patient has not had a diabetic eye exam in the last 12 months.             He did not have insurance, has not had medications for months.    Has been sleeping OK.    Current Outpatient Medications   Medication Sig Dispense Refill     atorvastatin (LIPITOR) 20 MG tablet Take 1 tablet (20 mg) by mouth daily 30 tablet 11     blood glucose (NO BRAND SPECIFIED) lancets standard Use to test blood sugar 1 time daily or as directed. 50 each 11     blood glucose (NO BRAND SPECIFIED) test strip Use to test blood sugar 1 time1 daily or as directed. 50 strip 11     blood glucose monitoring (NO BRAND SPECIFIED) meter device kit Use to test blood sugar 1 time daily or as directed. 1 kit 0     glipiZIDE (GLUCOTROL XL) 5 MG 24 hr tablet Take 2 tablets (10 mg) by mouth daily (with breakfast) 60 tablet 11     lisinopril (ZESTRIL) 20 MG tablet Take 1 tablet (20 mg) by mouth daily 30 tablet 11     metFORMIN (GLUCOPHAGE) 500 MG tablet TAKE 2 TABLETS(1000 MG) BY MOUTH TWICE DAILY WITH MEALS 120 tablet 11     albuterol (PROAIR HFA/PROVENTIL HFA/VENTOLIN HFA) 108 (90 Base) MCG/ACT inhaler Inhale 2 puffs into the lungs every 4 hours as needed (Patient not taking: Reported on 8/22/2022) 18 g 11     amLODIPine (NORVASC) 10 MG tablet Take 1 tablet (10 mg) by mouth daily (Patient not taking: Reported on 8/22/2022) 30 tablet 11     traZODone (DESYREL) 50 MG tablet TAKE 1/2 TO 1 TABLET BY MOUTH EVERY NIGHT AT BEDTIME AS NEEDED FOR SLEEP (Patient not taking: Reported on 8/22/2022) 30 tablet 1         Review of Systems   No fever or cough.      Objective    BP (!) 158/90   Pulse 75   Temp 97.8  F (36.6  C)  "(Oral)   Resp 16   Ht 1.575 m (5' 2\")   Wt 66.6 kg (146 lb 12 oz)   SpO2 98%   BMI 26.84 kg/m    Body mass index is 26.84 kg/m .  Physical Exam   Heart normal  Lungs normal  Feet normal    A1c 14.6, had been 11.4                .  ..  "

## 2022-11-25 ENCOUNTER — OFFICE VISIT (OUTPATIENT)
Dept: FAMILY MEDICINE | Facility: CLINIC | Age: 67
End: 2022-11-25
Payer: COMMERCIAL

## 2022-11-25 VITALS
DIASTOLIC BLOOD PRESSURE: 80 MMHG | RESPIRATION RATE: 16 BRPM | SYSTOLIC BLOOD PRESSURE: 130 MMHG | HEART RATE: 79 BPM | OXYGEN SATURATION: 98 % | HEIGHT: 62 IN | BODY MASS INDEX: 27.6 KG/M2 | TEMPERATURE: 98 F | WEIGHT: 150 LBS

## 2022-11-25 DIAGNOSIS — E11.9 TYPE 2 DIABETES MELLITUS WITHOUT COMPLICATION, WITHOUT LONG-TERM CURRENT USE OF INSULIN (H): Primary | ICD-10-CM

## 2022-11-25 DIAGNOSIS — Z23 NEED FOR VACCINATION: ICD-10-CM

## 2022-11-25 DIAGNOSIS — I10 ESSENTIAL HYPERTENSION WITH GOAL BLOOD PRESSURE LESS THAN 140/90: ICD-10-CM

## 2022-11-25 LAB — HBA1C MFR BLD: 12.3 % (ref 0–5.6)

## 2022-11-25 PROCEDURE — 99214 OFFICE O/P EST MOD 30 MIN: CPT | Mod: 25 | Performed by: FAMILY MEDICINE

## 2022-11-25 PROCEDURE — 36415 COLL VENOUS BLD VENIPUNCTURE: CPT | Performed by: FAMILY MEDICINE

## 2022-11-25 PROCEDURE — 83036 HEMOGLOBIN GLYCOSYLATED A1C: CPT | Performed by: FAMILY MEDICINE

## 2022-11-25 PROCEDURE — 90662 IIV NO PRSV INCREASED AG IM: CPT | Performed by: FAMILY MEDICINE

## 2022-11-25 PROCEDURE — 90471 IMMUNIZATION ADMIN: CPT | Performed by: FAMILY MEDICINE

## 2022-11-25 RX ORDER — LANCETS 30 GAUGE
EACH MISCELLANEOUS
COMMUNITY
Start: 2022-08-22

## 2022-11-25 RX ORDER — PIOGLITAZONEHYDROCHLORIDE 15 MG/1
15 TABLET ORAL DAILY
Qty: 30 TABLET | Refills: 11 | Status: SHIPPED | OUTPATIENT
Start: 2022-11-25 | End: 2023-08-18

## 2022-11-25 NOTE — PROGRESS NOTES
"  Assessment & Plan     Type 2 diabetes mellitus without complication, without long-term current use of insulin (H)    Not controlled.    Begin pioglitazone.    - Hemoglobin A1c  - Hemoglobin A1c  - pioglitazone (ACTOS) 15 MG tablet  Dispense: 30 tablet; Refill: 11    Essential hypertension with goal blood pressure less than 140/90    Controlled.    Need for vaccination    - INFLUENZA VACCINE 65+ (FLUZONE HD)      Prescription drug management         BMI:   Estimated body mass index is 27.44 kg/m  as calculated from the following:    Height as of this encounter: 1.575 m (5' 2\").    Weight as of this encounter: 68 kg (150 lb).           Return in about 4 months (around 3/25/2023) for DM.    Omar Chaney MD  [facility] MARGARET Patel is a 67 year old, presenting for the following health issues:  Diabetes      History of Present Illness       Diabetes:   He presents for follow up of diabetes.  He is checking home blood glucose a few times a week. He checks blood glucose before meals.  Blood glucose is sometimes over 200 and never under 70. When his blood glucose is low, the patient is asymptomatic for confusion, blurred vision, lethargy and reports not feeling dizzy, shaky, or weak.  He has no concerns regarding his diabetes at this time.  He is not experiencing numbness or burning in feet, excessive thirst, blurry vision, weight changes or redness, sores or blisters on feet. The patient has not had a diabetic eye exam in the last 12 months.             Has been taking metformin and glipizide.    Current Outpatient Medications   Medication Sig Dispense Refill     atorvastatin (LIPITOR) 20 MG tablet Take 1 tablet (20 mg) by mouth daily 30 tablet 11     blood glucose (NO BRAND SPECIFIED) lancets standard Use to test blood sugar 1 time daily or as directed. 50 each 11     blood glucose (NO BRAND SPECIFIED) test strip Use to test blood sugar 1 time1 daily or as directed. 50 strip 11     " "blood glucose monitoring (NO BRAND SPECIFIED) meter device kit Use to test blood sugar 1 time daily or as directed. 1 kit 0     glipiZIDE (GLUCOTROL XL) 5 MG 24 hr tablet Take 2 tablets (10 mg) by mouth daily (with breakfast) 60 tablet 11     Global Inject Ease Lancets 30G MISC        lisinopril (ZESTRIL) 20 MG tablet Take 1 tablet (20 mg) by mouth daily 30 tablet 11     metFORMIN (GLUCOPHAGE) 500 MG tablet TAKE 2 TABLETS(1000 MG) BY MOUTH TWICE DAILY WITH MEALS 120 tablet 11     pioglitazone (ACTOS) 15 MG tablet Take 1 tablet (15 mg) by mouth daily 30 tablet 11     albuterol (PROAIR HFA/PROVENTIL HFA/VENTOLIN HFA) 108 (90 Base) MCG/ACT inhaler Inhale 2 puffs into the lungs every 4 hours as needed (Patient not taking: Reported on 8/22/2022) 18 g 11     amLODIPine (NORVASC) 10 MG tablet Take 1 tablet (10 mg) by mouth daily (Patient not taking: Reported on 8/22/2022) 30 tablet 11     traZODone (DESYREL) 50 MG tablet TAKE 1/2 TO 1 TABLET BY MOUTH EVERY NIGHT AT BEDTIME AS NEEDED FOR SLEEP (Patient not taking: Reported on 8/22/2022) 30 tablet 1         Review of Systems   No fever or cough.      Objective    Pulse 79   Temp 98  F (36.7  C) (Oral)   Resp 16   Ht 1.575 m (5' 2\")   Wt 68 kg (150 lb)   SpO2 98%   BMI 27.44 kg/m    Body mass index is 27.44 kg/m .  Physical Exam   Heart normal  Lungs normal    A1c 12.6, had been 14.6                    "

## 2023-01-30 ENCOUNTER — PATIENT OUTREACH (OUTPATIENT)
Dept: GERIATRIC MEDICINE | Facility: CLINIC | Age: 68
End: 2023-01-30
Payer: COMMERCIAL

## 2023-02-01 NOTE — PROGRESS NOTES
[facility] Six-Month Telephone Assessment    6 month telephone assessment completed on 01/30/23.    ER visits: No  Hospitalizations: No  TCU stays: No  Significant health status changes: None  Falls/Injuries: No  ADL/IADL changes: No  Changes in services: No    Caregiver Assessment follow up:  NA    Goals: See POC in chart for goal progress documentation.      Will see member in 6 months for an annual health risk assessment.   Encouraged member to call CC with any questions or concerns in the meantime.     Skye AMOR  [facility]  [phone]

## 2023-03-31 ENCOUNTER — OFFICE VISIT (OUTPATIENT)
Dept: FAMILY MEDICINE | Facility: CLINIC | Age: 68
End: 2023-03-31
Payer: COMMERCIAL

## 2023-03-31 ENCOUNTER — NURSE TRIAGE (OUTPATIENT)
Dept: NURSING | Facility: CLINIC | Age: 68
End: 2023-03-31

## 2023-03-31 VITALS
TEMPERATURE: 97.8 F | SYSTOLIC BLOOD PRESSURE: 124 MMHG | DIASTOLIC BLOOD PRESSURE: 75 MMHG | RESPIRATION RATE: 16 BRPM | WEIGHT: 151 LBS | HEIGHT: 62 IN | HEART RATE: 86 BPM | OXYGEN SATURATION: 99 % | BODY MASS INDEX: 27.79 KG/M2

## 2023-03-31 DIAGNOSIS — I10 ESSENTIAL HYPERTENSION WITH GOAL BLOOD PRESSURE LESS THAN 140/90: ICD-10-CM

## 2023-03-31 DIAGNOSIS — E11.9 TYPE 2 DIABETES MELLITUS WITHOUT COMPLICATION, WITHOUT LONG-TERM CURRENT USE OF INSULIN (H): Primary | ICD-10-CM

## 2023-03-31 LAB
ANION GAP SERPL CALCULATED.3IONS-SCNC: 16 MMOL/L (ref 7–15)
BUN SERPL-MCNC: 13.6 MG/DL (ref 8–23)
CALCIUM SERPL-MCNC: 9.9 MG/DL (ref 8.8–10.2)
CHLORIDE SERPL-SCNC: 94 MMOL/L (ref 98–107)
CREAT SERPL-MCNC: 0.82 MG/DL (ref 0.67–1.17)
DEPRECATED HCO3 PLAS-SCNC: 24 MMOL/L (ref 22–29)
GFR SERPL CREATININE-BSD FRML MDRD: >90 ML/MIN/1.73M2
GLUCOSE SERPL-MCNC: 545 MG/DL (ref 70–99)
HBA1C MFR BLD: <15 % (ref 0–5.6)
POTASSIUM SERPL-SCNC: 4.7 MMOL/L (ref 3.4–5.3)
SODIUM SERPL-SCNC: 134 MMOL/L (ref 136–145)

## 2023-03-31 PROCEDURE — 36415 COLL VENOUS BLD VENIPUNCTURE: CPT | Performed by: FAMILY MEDICINE

## 2023-03-31 PROCEDURE — 83036 HEMOGLOBIN GLYCOSYLATED A1C: CPT | Performed by: FAMILY MEDICINE

## 2023-03-31 PROCEDURE — 80048 BASIC METABOLIC PNL TOTAL CA: CPT | Performed by: FAMILY MEDICINE

## 2023-03-31 PROCEDURE — 99214 OFFICE O/P EST MOD 30 MIN: CPT | Performed by: FAMILY MEDICINE

## 2023-03-31 ASSESSMENT — ASTHMA QUESTIONNAIRES
ACT_TOTALSCORE: 24
ACT_TOTALSCORE: 24
QUESTION_1 LAST FOUR WEEKS HOW MUCH OF THE TIME DID YOUR ASTHMA KEEP YOU FROM GETTING AS MUCH DONE AT WORK, SCHOOL OR AT HOME: NONE OF THE TIME
QUESTION_5 LAST FOUR WEEKS HOW WOULD YOU RATE YOUR ASTHMA CONTROL: WELL CONTROLLED
QUESTION_3 LAST FOUR WEEKS HOW OFTEN DID YOUR ASTHMA SYMPTOMS (WHEEZING, COUGHING, SHORTNESS OF BREATH, CHEST TIGHTNESS OR PAIN) WAKE YOU UP AT NIGHT OR EARLIER THAN USUAL IN THE MORNING: NOT AT ALL
QUESTION_2 LAST FOUR WEEKS HOW OFTEN HAVE YOU HAD SHORTNESS OF BREATH: NOT AT ALL
QUESTION_4 LAST FOUR WEEKS HOW OFTEN HAVE YOU USED YOUR RESCUE INHALER OR NEBULIZER MEDICATION (SUCH AS ALBUTEROL): NOT AT ALL

## 2023-03-31 NOTE — PROGRESS NOTES
"  Assessment & Plan     Type 2 diabetes mellitus without complication, without long-term current use of insulin (H)    Not controlled.  His medications will resume.  He will decrease rice intake to a fist-sized portion with meals.    - Hemoglobin A1c  - Hemoglobin A1c  - Basic metabolic panel  (Ca, Cl, CO2, Creat, Gluc, K, Na, BUN)  - Basic metabolic panel  (Ca, Cl, CO2, Creat, Gluc, K, Na, BUN)    Essential hypertension with goal blood pressure less than 140/90    Stable.        31 minutes spent by me on the date of the encounter doing chart review, review of test results, patient visit and discussion with other provider(s) regarding diabetes, HTN.       BMI:   Estimated body mass index is 27.62 kg/m  as calculated from the following:    Height as of this encounter: 1.575 m (5' 2\").    Weight as of this encounter: 68.5 kg (151 lb).           Omar Chaney MD  [facility] MARGARET Patel is a 67 year old, presenting for the following health issues:  Diabetes    Additional Questions 3/31/2023   Roomed by jillian     History of Present Illness       Diabetes:   He presents for follow up of diabetes.  He is checking home blood glucose one time daily. He checks blood glucose before meals.  Blood glucose is sometimes over 200 and never under 70. When his blood glucose is low, the patient is asymptomatic for confusion, blurred vision, lethargy and reports not feeling dizzy, shaky, or weak.  He is concerned about blood sugar frequently over 200.  He is not experiencing numbness or burning in feet, excessive thirst, blurry vision, weight changes or redness, sores or blisters on feet. The patient has not had a diabetic eye exam in the last 12 months.         He eats 2-3 servings of fruits and vegetables daily.He consumes 0 sweetened beverage(s) daily.He exercises with enough effort to increase his heart rate 10 to 19 minutes per day.  He exercises with enough effort to increase his heart rate 7 days " "per week.   He is taking medications regularly.       The only medication he has now is pioglitazone.  Bottle is from 11/28/22, he has 1 pill remaining of the 30 that were dispensed.    I called his pharmacy.  Auto refill had stopped.  They reinstated it, and will resume delivery.    He has one plate of rice twice daily.    Current Outpatient Medications   Medication Sig Dispense Refill     atorvastatin (LIPITOR) 20 MG tablet Take 1 tablet (20 mg) by mouth daily 30 tablet 11     blood glucose (NO BRAND SPECIFIED) lancets standard Use to test blood sugar 1 time daily or as directed. 50 each 11     blood glucose (NO BRAND SPECIFIED) test strip Use to test blood sugar 1 time1 daily or as directed. 50 strip 11     blood glucose monitoring (NO BRAND SPECIFIED) meter device kit Use to test blood sugar 1 time daily or as directed. 1 kit 0     glipiZIDE (GLUCOTROL XL) 5 MG 24 hr tablet Take 2 tablets (10 mg) by mouth daily (with breakfast) 60 tablet 11     Global Inject Ease Lancets 30G MISC        metFORMIN (GLUCOPHAGE) 500 MG tablet TAKE 2 TABLETS(1000 MG) BY MOUTH TWICE DAILY WITH MEALS 120 tablet 11     pioglitazone (ACTOS) 15 MG tablet Take 1 tablet (15 mg) by mouth daily 30 tablet 11     albuterol (PROAIR HFA/PROVENTIL HFA/VENTOLIN HFA) 108 (90 Base) MCG/ACT inhaler Inhale 2 puffs into the lungs every 4 hours as needed (Patient not taking: Reported on 8/22/2022) 18 g 11           Review of Systems   No fever or cough.      Objective    /75   Pulse 86   Temp 97.8  F (36.6  C) (Oral)   Resp 16   Ht 1.575 m (5' 2\")   Wt 68.5 kg (151 lb)   SpO2 99%   BMI 27.62 kg/m    Body mass index is 27.62 kg/m .  Physical Exam   Heart normal  Lungs normal    A1c >15                    "

## 2023-04-01 NOTE — TELEPHONE ENCOUNTER
Lab calling with critical value at 2053.    Critical Lab Value: Glucose 545  Lab Drawn at: 1515   Ordering Provider: Dr Chaney    Paged on call provider at: Dr Kristel Joseph  Provider called back at: 2113    Provider Recommendations: Inform PCP  Does on call provider want RN to call patient tonight? Yes  Should message be routed to PCP to follow up of lab result? Yes    Triage Call:  Patient has Type 2 diabetes, he takes glipizide and metformin in the morning. Patient reports that he hadn't eaten anything prior to labs other than coffee. He reports that he left his test strips at his son's home and will check blood glucose in the morning. His A1C was 15 today. He reports that he does not feel abnormal in any way.     Gita Singer RN  03/31/23 9:10 PM  [facility] Nurse Advisor    Reason for Disposition    Lab or radiology calling with CRITICAL test results    Additional Information    Negative: Lab calling with strep throat test results and triager can call in prescription    Negative: Lab calling with urinalysis test results and triager can call in prescription    Negative: Medication questions    Negative: Medication renewal and refill questions    Negative: Pre-operative or pre-procedural questions    Negative: Call about patient who is currently hospitalized    Negative: Doctor (or NP/PA) call to PCP    Negative: ED call to PCP (i.e., primary care provider; doctor, NP, or PA)    Protocols used: PCP CALL - NO [facility]

## 2023-06-06 ENCOUNTER — PATIENT OUTREACH (OUTPATIENT)
Dept: GERIATRIC MEDICINE | Facility: CLINIC | Age: 68
End: 2023-06-06
Payer: MEDICARE

## 2023-06-06 ASSESSMENT — ACTIVITIES OF DAILY LIVING (ADL): DEPENDENT_IADLS:: INDEPENDENT

## 2023-06-16 ENCOUNTER — PATIENT OUTREACH (OUTPATIENT)
Dept: GERIATRIC MEDICINE | Facility: CLINIC | Age: 68
End: 2023-06-16
Payer: MEDICARE

## 2023-06-16 NOTE — LETTER
[address], [address]  [place] [address]  Phone:  [phone]  Fax:  [phone]      June 16, 2023    JORGE AUSTIN  [address] [place] [address]    Dear Jogre,    Enclosed is a copy of your completed PCA Assessment and Service Plan.  This is for your records and no action is required by you.  If you have additional questions regarding your assessment please contact me at [phone]. If you feel that your needs are not being met, please contact the Clinical Supervisor at [phone].    Sincerely,    MT Suh  [phone]  [email or web address]                  Enclosure:  Completed PCA assessment

## 2023-06-16 NOTE — TELEPHONE ENCOUNTER
[facility] Care Coordination Contact    [facility]:  Emailed completed PCA assessment to [facility].  Faxed copy of PCA assessment to PCA Agency and mailed copy to member.  Faxed MD Communication to PCP.     Zo Gomez  Care Management Specialist  [facility]  [phone]

## 2023-06-30 ENCOUNTER — PATIENT OUTREACH (OUTPATIENT)
Dept: GERIATRIC MEDICINE | Facility: CLINIC | Age: 68
End: 2023-06-30
Payer: MEDICARE

## 2023-06-30 NOTE — LETTER
June 30, 2023    JORGE AUSTIN  [address] [place] [address]        Dear Jorge:    At [facility], we re dedicated to improving your health and wellness. Enclosed is the Care Plan developed with you on 6/6/23. Please review the Care Plan carefully.    As a reminder, during your visit we talked about:  Ways to manage your physical and mental health  Using health care to maintain and improve your health   Your preventive care needs     Remember to contact your care coordinator if you:  Are hospitalized, or plan to be hospitalized   Have a fall    Have a change in your physical or mental health  Need help finding support or services    If you have questions, or don t agree with your Care Plan, call me at [phone]. You can also call me if your needs change. TTY users, call the [place] [employer] at (935) or 1-568.531.8797 (ixkvlg-av-kbwint relay service).    Sincerely,        MT Suh  [phone]  [email or web address]          [id number] accepted    [id number] (07/2022)

## 2023-06-30 NOTE — PROGRESS NOTES
[facility] Care Coordination Contact    Received after visit chart from care coordinator.  Completed following tasks: Mailed copy of care plan to client and Mailed Safe Medication Disposal    Mailed [employer] leave behind doc.     Tiera Sarabia  Care Management Specialist   [facility]   [phone]

## 2023-06-30 NOTE — PROGRESS NOTES
[facility] Home Visit Assessment     Home visit for Health Risk Assessment with Jorge Bourne completed on June 6, 2023    Type of residence:: Private home - stairs  Current living arrangement:: I live in a private home with family     Assessment completed with:: Patient    Current Care Plan  Member currently receiving the following home care services: None     Member currently receiving the following community resources: None      Medication Review  Medication reconciliation completed in Epic: Yes  Medication set-up & administration: Independent and sets up on own daily.  Self-administers medications.  Medication Risk Assessment Medication (1 or more, place referral to MTM): N/A: No risk factors identified  MTM Referral Placed: No: No risk factors idenified    Mental/Behavioral Health   Depression Screening:   PHQ-2 Total Score (Adult) - Positive if 3 or more points; Administer PHQ-9 if positive: 0     Falls Assessment:   Fallen 2 or more times in the past year?: No   Any fall with injury in the past year?: No    ADL/IADL Dependencies:   Dependent ADLs:: Independent  Dependent IADLs:: Independent    [facility] sponsored benefits: Shared information re: [employer]/g[employer] memberships, ASA, Calcium +D.    PCA Assessment completed at visit: Yes PCA assessment indicated that member does not meet access criteria for PCA.      Elderly Waiver Eligibility: No-does not meet criteria    Care Plan & Recommendations: Tayla states he has concerns with mobility due to knee pain and he has not discussed this with his PCP. Care coordinator advised at next appointment he discuss this. He also states he has incontnence concerns which he has also not discussed his PCP.     See [facility] for detailed assessment information.    Follow-Up Plan: Member informed of future contact, plan to f/u with member with a 6 month telephone assessment.  Contact information shared with member and family, encouraged member to call with any  questions or concerns at any time.    [facility] care continuum providers: Please see Snapshot and Care Management Flowsheets for Specific details of care plan.    This CC note routed to PCP.    Skye AMOR  [facility]  [phone]

## 2023-07-28 ENCOUNTER — OFFICE VISIT (OUTPATIENT)
Dept: FAMILY MEDICINE | Facility: CLINIC | Age: 68
End: 2023-07-28
Payer: MEDICARE

## 2023-07-28 VITALS
HEIGHT: 62 IN | HEART RATE: 82 BPM | OXYGEN SATURATION: 98 % | TEMPERATURE: 97.8 F | DIASTOLIC BLOOD PRESSURE: 91 MMHG | WEIGHT: 158.4 LBS | RESPIRATION RATE: 18 BRPM | BODY MASS INDEX: 29.15 KG/M2 | SYSTOLIC BLOOD PRESSURE: 154 MMHG

## 2023-07-28 DIAGNOSIS — E11.9 TYPE 2 DIABETES MELLITUS WITHOUT COMPLICATION, WITHOUT LONG-TERM CURRENT USE OF INSULIN (H): Primary | ICD-10-CM

## 2023-07-28 DIAGNOSIS — Z11.59 NEED FOR HEPATITIS C SCREENING TEST: ICD-10-CM

## 2023-07-28 DIAGNOSIS — M25.561 RIGHT KNEE PAIN, UNSPECIFIED CHRONICITY: ICD-10-CM

## 2023-07-28 DIAGNOSIS — E78.2 MIXED HYPERLIPIDEMIA: ICD-10-CM

## 2023-07-28 DIAGNOSIS — J45.20 MILD INTERMITTENT ASTHMA WITHOUT COMPLICATION: ICD-10-CM

## 2023-07-28 LAB — HBA1C MFR BLD: 12.8 % (ref 0–5.6)

## 2023-07-28 PROCEDURE — 83036 HEMOGLOBIN GLYCOSYLATED A1C: CPT | Performed by: FAMILY MEDICINE

## 2023-07-28 PROCEDURE — 80061 LIPID PANEL: CPT | Performed by: FAMILY MEDICINE

## 2023-07-28 PROCEDURE — 99214 OFFICE O/P EST MOD 30 MIN: CPT | Performed by: FAMILY MEDICINE

## 2023-07-28 PROCEDURE — 86803 HEPATITIS C AB TEST: CPT | Performed by: FAMILY MEDICINE

## 2023-07-28 PROCEDURE — 36415 COLL VENOUS BLD VENIPUNCTURE: CPT | Performed by: FAMILY MEDICINE

## 2023-07-28 NOTE — PROGRESS NOTES
"  Assessment & Plan     Type 2 diabetes mellitus without complication, without long-term current use of insulin (H)    Not controlled.    Increase pioglitazone to 30 mg.    - Albumin Random Urine Quantitative with Creat Ratio  - Hemoglobin A1c  - Hemoglobin A1c  - PRIMARY CARE FOLLOW-UP SCHEDULING  - blood glucose (NO BRAND SPECIFIED) lancets standard  Dispense: 50 each; Refill: 11  - blood glucose (NO BRAND SPECIFIED) test strip  Dispense: 50 strip; Refill: 11  - glipiZIDE (GLUCOTROL XL) 5 MG 24 hr tablet  Dispense: 60 tablet; Refill: 11  - metFORMIN (GLUCOPHAGE) 500 MG tablet  Dispense: 120 tablet; Refill: 11  - pioglitazone (ACTOS) 30 MG tablet  Dispense: 30 tablet; Refill: 11    Right knee pain, unspecified chronicity    For a few months.  Not controlled.  Possible meniscal injury.    - Orthopedic  Referral    Need for hepatitis C screening test    - Hepatitis C Screen Reflex to HCV RNA Quant and Genotype  - Hepatitis C Screen Reflex to HCV RNA Quant and Genotype    Mixed hyperlipidemia    Stable.    - Lipid panel reflex to direct LDL Non-fasting  - Lipid panel reflex to direct LDL Non-fasting  - atorvastatin (LIPITOR) 20 MG tablet  Dispense: 30 tablet; Refill: 11    Mild intermittent asthma without complication    Stable.    - albuterol (PROAIR HFA/PROVENTIL HFA/VENTOLIN HFA) 108 (90 Base) MCG/ACT inhaler  Dispense: 18 g; Refill: 11      Pt will call insurance to request evaluation for PCA.      Ordering of each unique test  Prescription drug management         BMI:   Estimated body mass index is 28.96 kg/m  as calculated from the following:    Height as of this encounter: 1.575 m (5' 2.01\").    Weight as of this encounter: 71.8 kg (158 lb 6.4 oz).           Omar Chaney MD  [facility] MARGARET Patel is a 68 year old, presenting for the following health issues:  Diabetes (F/u)      7/28/2023     3:21 PM   Additional Questions   Roomed by June Silveira RN   Accompanied " by fabio: Kiran         7/28/2023     3:21 PM   Patient Reported Additional Medications   Patient reports taking the following new medications no       History of Present Illness       Diabetes:   He presents for follow up of diabetes.  He is checking home blood glucose a few times a week.   He checks blood glucose before meals.  Blood glucose is sometimes over 200 and never under 70. He is aware of hypoglycemia symptoms including dizziness.    He has no concerns regarding his diabetes at this time.  He is having numbness in feet, burning in feet, redness, sores, or blisters on feet, excessive thirst, blurry vision, weight loss and weight gain.  The patient has not had a diabetic eye exam in the last 12 months.            He has had right knee pain and swelling for a few months.    Niece, Vanessa [facility], [phone].    Current Outpatient Medications   Medication Sig Dispense Refill    albuterol (PROAIR HFA/PROVENTIL HFA/VENTOLIN HFA) 108 (90 Base) MCG/ACT inhaler Inhale 2 puffs into the lungs every 4 hours as needed 18 g 11    atorvastatin (LIPITOR) 20 MG tablet Take 1 tablet (20 mg) by mouth daily 30 tablet 11    blood glucose (NO BRAND SPECIFIED) lancets standard Use to test blood sugar 1 time daily or as directed. 50 each 11    blood glucose (NO BRAND SPECIFIED) test strip Use to test blood sugar 1 time1 daily or as directed. 50 strip 11    blood glucose monitoring (NO BRAND SPECIFIED) meter device kit Use to test blood sugar 1 time daily or as directed. 1 kit 0    glipiZIDE (GLUCOTROL XL) 5 MG 24 hr tablet Take 2 tablets (10 mg) by mouth daily (with breakfast) 60 tablet 11    Global Inject Ease Lancets 30G MISC       metFORMIN (GLUCOPHAGE) 500 MG tablet TAKE 2 TABLETS(1000 MG) BY MOUTH TWICE DAILY WITH MEALS 120 tablet 11    pioglitazone (ACTOS) 30 MG tablet Take 1 tablet (30 mg) by mouth daily 30 tablet 11         Review of Systems   No fever.      Objective    BP (!) 154/91 (BP Location: Right arm, Patient Position:  "Sitting, Cuff Size: Adult Regular)   Pulse 82   Temp 97.8  F (36.6  C) (Temporal)   Resp 18   Ht 1.575 m (5' 2.01\")   Wt 71.8 kg (158 lb 6.4 oz)   SpO2 98%   BMI 28.96 kg/m    Body mass index is 28.96 kg/m .  Physical Exam   Heart normal  Lungs normal  Right knee: mild effusion.  No erythema.  No ligament instability.  Batsheva positive.    A1c 12.8, had been >15                      "

## 2023-07-29 LAB
CHOLEST SERPL-MCNC: 166 MG/DL
HCV AB SERPL QL IA: NONREACTIVE
HDLC SERPL-MCNC: 54 MG/DL
LDLC SERPL CALC-MCNC: 72 MG/DL
NONHDLC SERPL-MCNC: 112 MG/DL
TRIGL SERPL-MCNC: 199 MG/DL

## 2023-08-18 RX ORDER — ATORVASTATIN CALCIUM 20 MG/1
20 TABLET, FILM COATED ORAL DAILY
Qty: 30 TABLET | Refills: 11 | Status: SHIPPED | OUTPATIENT
Start: 2023-08-18 | End: 2024-08-28

## 2023-08-18 RX ORDER — PIOGLITAZONEHYDROCHLORIDE 30 MG/1
30 TABLET ORAL DAILY
Qty: 30 TABLET | Refills: 11 | Status: SHIPPED | OUTPATIENT
Start: 2023-08-18 | End: 2024-01-10

## 2023-08-18 RX ORDER — ALBUTEROL SULFATE 90 UG/1
2 AEROSOL, METERED RESPIRATORY (INHALATION) EVERY 4 HOURS PRN
Qty: 18 G | Refills: 11 | Status: SHIPPED | OUTPATIENT
Start: 2023-08-18

## 2023-08-18 RX ORDER — GLIPIZIDE 5 MG/1
10 TABLET, FILM COATED, EXTENDED RELEASE ORAL
Qty: 60 TABLET | Refills: 11 | Status: SHIPPED | OUTPATIENT
Start: 2023-08-18 | End: 2024-08-28

## 2023-08-30 ENCOUNTER — TELEPHONE (OUTPATIENT)
Dept: FAMILY MEDICINE | Facility: CLINIC | Age: 68
End: 2023-08-30
Payer: MEDICARE

## 2023-08-30 NOTE — TELEPHONE ENCOUNTER
Patient Quality Outreach    Patient is due for the following:   Asthma  -  ACT needed  Physical Preventive Adult Physical    Next Steps:   Schedule a Adult Preventative    Type of outreach:    Phone, spoke to patient/parent. Pt agree to schedule for physical     Next Steps:  Reach out within 90 days via Phone.    Max number of attempts reached: No. Will try again in 90 days if patient still on fail list.    Questions for provider review:    None           Marilee Bernstein MA  Chart routed to Care Team.

## 2023-09-20 ENCOUNTER — PATIENT OUTREACH (OUTPATIENT)
Dept: GERIATRIC MEDICINE | Facility: CLINIC | Age: 68
End: 2023-09-20

## 2023-09-20 ENCOUNTER — OFFICE VISIT (OUTPATIENT)
Dept: FAMILY MEDICINE | Facility: CLINIC | Age: 68
End: 2023-09-20
Payer: MEDICARE

## 2023-09-20 VITALS
HEIGHT: 63 IN | DIASTOLIC BLOOD PRESSURE: 82 MMHG | OXYGEN SATURATION: 99 % | BODY MASS INDEX: 28.08 KG/M2 | TEMPERATURE: 98.4 F | WEIGHT: 158.5 LBS | SYSTOLIC BLOOD PRESSURE: 146 MMHG | HEART RATE: 74 BPM | RESPIRATION RATE: 16 BRPM

## 2023-09-20 DIAGNOSIS — Z00.00 ROUTINE GENERAL MEDICAL EXAMINATION AT A HEALTH CARE FACILITY: Primary | ICD-10-CM

## 2023-09-20 DIAGNOSIS — I10 ESSENTIAL HYPERTENSION WITH GOAL BLOOD PRESSURE LESS THAN 140/90: ICD-10-CM

## 2023-09-20 DIAGNOSIS — Z12.11 SCREEN FOR COLON CANCER: ICD-10-CM

## 2023-09-20 DIAGNOSIS — Z23 NEED FOR VACCINATION: ICD-10-CM

## 2023-09-20 PROCEDURE — 99397 PER PM REEVAL EST PAT 65+ YR: CPT | Mod: 25 | Performed by: FAMILY MEDICINE

## 2023-09-20 PROCEDURE — 90662 IIV NO PRSV INCREASED AG IM: CPT | Performed by: FAMILY MEDICINE

## 2023-09-20 PROCEDURE — G0008 ADMIN INFLUENZA VIRUS VAC: HCPCS | Performed by: FAMILY MEDICINE

## 2023-09-20 RX ORDER — AMLODIPINE BESYLATE 2.5 MG/1
2.5 TABLET ORAL DAILY
Qty: 30 TABLET | Refills: 11 | Status: SHIPPED | OUTPATIENT
Start: 2023-09-20 | End: 2024-09-30

## 2023-09-20 ASSESSMENT — ACTIVITIES OF DAILY LIVING (ADL)
CURRENT_FUNCTION: LAUNDRY REQUIRES ASSISTANCE
CURRENT_FUNCTION: BATHING REQUIRES ASSISTANCE
CURRENT_FUNCTION: TELEPHONE REQUIRES ASSISTANCE
CURRENT_FUNCTION: SHOPPING REQUIRES ASSISTANCE
CURRENT_FUNCTION: MEDICATION ADMINISTRATION REQUIRES ASSISTANCE
CURRENT_FUNCTION: HOUSEWORK REQUIRES ASSISTANCE
CURRENT_FUNCTION: MONEY MANAGEMENT REQUIRES ASSISTANCE
CURRENT_FUNCTION: TRANSPORTATION REQUIRES ASSISTANCE
CURRENT_FUNCTION: PREPARING MEALS REQUIRES ASSISTANCE

## 2023-09-20 ASSESSMENT — ENCOUNTER SYMPTOMS
NERVOUS/ANXIOUS: 1
WEAKNESS: 1
NAUSEA: 1
FREQUENCY: 1
PALPITATIONS: 1
MYALGIAS: 1
ABDOMINAL PAIN: 1
FEVER: 0
HEMATURIA: 0
CHILLS: 0
EYE PAIN: 1
DIZZINESS: 1
ARTHRALGIAS: 1
SORE THROAT: 1
CONSTIPATION: 1
PARESTHESIAS: 1
HEMATOCHEZIA: 1
HEARTBURN: 1
HEADACHES: 1
COUGH: 0
SHORTNESS OF BREATH: 1
DYSURIA: 0
JOINT SWELLING: 1
DIARRHEA: 0

## 2023-09-20 ASSESSMENT — ASTHMA QUESTIONNAIRES
QUESTION_2 LAST FOUR WEEKS HOW OFTEN HAVE YOU HAD SHORTNESS OF BREATH: MORE THAN ONCE A DAY
QUESTION_3 LAST FOUR WEEKS HOW OFTEN DID YOUR ASTHMA SYMPTOMS (WHEEZING, COUGHING, SHORTNESS OF BREATH, CHEST TIGHTNESS OR PAIN) WAKE YOU UP AT NIGHT OR EARLIER THAN USUAL IN THE MORNING: FOUR OR MORE NIGHTS A WEEK
ACT_TOTALSCORE: 7
QUESTION_1 LAST FOUR WEEKS HOW MUCH OF THE TIME DID YOUR ASTHMA KEEP YOU FROM GETTING AS MUCH DONE AT WORK, SCHOOL OR AT HOME: ALL OF THE TIME
QUESTION_5 LAST FOUR WEEKS HOW WOULD YOU RATE YOUR ASTHMA CONTROL: POORLY CONTROLLED
QUESTION_4 LAST FOUR WEEKS HOW OFTEN HAVE YOU USED YOUR RESCUE INHALER OR NEBULIZER MEDICATION (SUCH AS ALBUTEROL): ONE OR TWO TIMES PER DAY
ACT_TOTALSCORE: 7

## 2023-09-20 NOTE — PROGRESS NOTES
Care coordinator received telephone call from granddaughter and she is calling regarding PCA services for the member. CC advised we already saw member and he did not qualify for PCA services at the time. They need to reach out to [facility] customer services.     Skye Machuca [facility]  [phone]

## 2023-09-20 NOTE — PROGRESS NOTES
"SUBJECTIVE:   CC: Jorge is an 68 year old who presents for preventative health visit.       2023     8:32 AM   Additional Questions   Roomed by Alex MAST   Accompanied by madhavi       Healthy Habits:     In general, how would you rate your overall health?  Poor    Frequency of exercise:  1 day/week    Duration of exercise:  Less than 15 minutes    Do you usually eat at least 4 servings of fruit and vegetables a day, include whole grains    & fiber and avoid regularly eating high fat or \"junk\" foods?  Yes    Ability to successfully perform activities of daily living:  Telephone requires assistance, transportation requires assistance, shopping requires assistance, preparing meals requires assistance, housework requires assistance, bathing requires assistance, laundry requires assistance, medication administration requires assistance and money management requires assistance    Home Safety:  No safety concerns identified    Hearing Impairment:  Difficulty following a conversation in a noisy restaurant or crowded room, feel that people are mumbling or not speaking clearly, difficulty following dialogue in the theater, difficult to understand a speaker at a public meeting or [employer] service, need to ask people to speak up or repeat themselves, difficulty understanding soft or whispered speech and difficulty understanding speech on the telephone    In the past 6 months, have you been bothered by leaking of urine? Yes    In general, how would you rate your overall mental or emotional health?  Poor    Additional concerns today:  Yes    Has eye appt    Nocturia 2-3          Have you ever done Advance Care Planning? (For example, a Health Directive, POLST, or a discussion with a medical provider or your loved ones about your wishes): No    Social History     Tobacco Use    Smoking status: Former     Types: Cigars     Quit date: 2006     Years since quittin.4     Passive exposure: Never    Smokeless tobacco: " Current     Types: Chew    Tobacco comments:     sometimes he chews betel nut w/ tobacco   Substance Use Topics    Alcohol use: No             9/20/2023     8:52 AM   Alcohol Use   Prescreen: >3 drinks/day or >7 drinks/week? Not Applicable       Last PSA: No results found for: PSA    Reviewed orders with patient. Reviewed health maintenance and updated orders accordingly - Yes      Reviewed and updated as needed this visit by clinical staff   Tobacco  Allergies  Meds              Reviewed and updated as needed this visit by Provider                 Current Outpatient Medications   Medication Sig Dispense Refill    albuterol (PROAIR HFA/PROVENTIL HFA/VENTOLIN HFA) 108 (90 Base) MCG/ACT inhaler Inhale 2 puffs into the lungs every 4 hours as needed 18 g 11    amLODIPine (NORVASC) 2.5 MG tablet Take 1 tablet (2.5 mg) by mouth daily 30 tablet 11    atorvastatin (LIPITOR) 20 MG tablet Take 1 tablet (20 mg) by mouth daily 30 tablet 11    blood glucose (NO BRAND SPECIFIED) lancets standard Use to test blood sugar 1 time daily or as directed. 50 each 11    blood glucose (NO BRAND SPECIFIED) test strip Use to test blood sugar 1 time1 daily or as directed. 50 strip 11    blood glucose monitoring (NO BRAND SPECIFIED) meter device kit Use to test blood sugar 1 time daily or as directed. 1 kit 0    glipiZIDE (GLUCOTROL XL) 5 MG 24 hr tablet Take 2 tablets (10 mg) by mouth daily (with breakfast) 60 tablet 11    Global Inject Ease Lancets 30G MISC       metFORMIN (GLUCOPHAGE) 500 MG tablet TAKE 2 TABLETS(1000 MG) BY MOUTH TWICE DAILY WITH MEALS 120 tablet 11    pioglitazone (ACTOS) 30 MG tablet Take 1 tablet (30 mg) by mouth daily 30 tablet 11         Review of Systems   Constitutional:  Negative for chills and fever.   HENT:  Positive for ear pain, hearing loss and sore throat. Negative for congestion.    Eyes:  Positive for pain and visual disturbance.   Respiratory:  Positive for shortness of breath. Negative for cough.   "  Cardiovascular:  Positive for chest pain, palpitations and peripheral edema.   Gastrointestinal:  Positive for abdominal pain, constipation, heartburn, hematochezia and nausea. Negative for diarrhea.   Genitourinary:  Positive for frequency, genital sores and urgency. Negative for dysuria, hematuria, impotence and penile discharge.   Musculoskeletal:  Positive for arthralgias, joint swelling and myalgias.   Skin:  Positive for rash.   Neurological:  Positive for dizziness, weakness, headaches and paresthesias.   Psychiatric/Behavioral:  Positive for mood changes. The patient is nervous/anxious.          OBJECTIVE:   BP (!) 146/82   Pulse 74   Temp 98.4  F (36.9  C) (Oral)   Resp 16   Ht 1.607 m (5' 3.25\")   Wt 71.9 kg (158 lb 8 oz)   SpO2 99%   BMI 27.86 kg/m      Physical Exam  Eyes: EOM full, pupils normal, conjunctivae normal.  Left cataract  Ears: TM's and canals normal  Oropharynx: normal  Neck: supple without adenopathy or thyromegaly  Lungs: normal  Heart: regular rhythm, normal rate, no murmur  Abdomen: no HSM, mass or tenderness  : uncircumcised, penis and testes normal, no inguinal hernia or adenopathy  Rectal: mild BPH, no nodule  Extremities: FROM, normal strength and sensation  Feet normal        ASSESSMENT/PLAN:   Jorge was seen today for physical.    Diagnoses and all orders for this visit:    Routine general medical examination at a health care facility    Screen for colon cancer  -     Colonoscopy Screening  Referral; Future    Need for vaccination  -     INFLUENZA VACCINE 65+ (FLUZONE HD)    Essential hypertension with goal blood pressure less than 140/90  -     amLODIPine (NORVASC) 2.5 MG tablet; Take 1 tablet (2.5 mg) by mouth daily      Has 11/28/23 appt with me for diabetes.      Patient has been advised of split billing requirements and indicates understanding: Yes      COUNSELING:   Reviewed preventive health counseling, as reflected in patient instructions       Healthy " "diet/nutrition      BMI:   Estimated body mass index is 27.86 kg/m  as calculated from the following:    Height as of this encounter: 1.607 m (5' 3.25\").    Weight as of this encounter: 71.9 kg (158 lb 8 oz).         He reports that he quit smoking about 17 years ago. His smoking use included cigars. He has never been exposed to tobacco smoke. His smokeless tobacco use includes chew.            Omar Chaney MD  [facility]  "

## 2023-11-28 ENCOUNTER — VIRTUAL VISIT (OUTPATIENT)
Dept: FAMILY MEDICINE | Facility: CLINIC | Age: 68
End: 2023-11-28
Payer: MEDICARE

## 2023-11-28 DIAGNOSIS — E11.9 TYPE 2 DIABETES MELLITUS WITHOUT COMPLICATION, WITHOUT LONG-TERM CURRENT USE OF INSULIN (H): Primary | ICD-10-CM

## 2023-11-28 PROCEDURE — 99441 PR PHYSICIAN TELEPHONE EVALUATION 5-10 MIN: CPT | Performed by: FAMILY MEDICINE

## 2023-11-28 ASSESSMENT — ASTHMA QUESTIONNAIRES
ACT_TOTALSCORE: 22
QUESTION_5 LAST FOUR WEEKS HOW WOULD YOU RATE YOUR ASTHMA CONTROL: SOMEWHAT CONTROLLED
ACT_TOTALSCORE: 22
QUESTION_4 LAST FOUR WEEKS HOW OFTEN HAVE YOU USED YOUR RESCUE INHALER OR NEBULIZER MEDICATION (SUCH AS ALBUTEROL): NOT AT ALL
QUESTION_2 LAST FOUR WEEKS HOW OFTEN HAVE YOU HAD SHORTNESS OF BREATH: NOT AT ALL
QUESTION_3 LAST FOUR WEEKS HOW OFTEN DID YOUR ASTHMA SYMPTOMS (WHEEZING, COUGHING, SHORTNESS OF BREATH, CHEST TIGHTNESS OR PAIN) WAKE YOU UP AT NIGHT OR EARLIER THAN USUAL IN THE MORNING: NOT AT ALL
QUESTION_1 LAST FOUR WEEKS HOW MUCH OF THE TIME DID YOUR ASTHMA KEEP YOU FROM GETTING AS MUCH DONE AT WORK, SCHOOL OR AT HOME: A LITTLE OF THE TIME

## 2023-11-28 NOTE — PROGRESS NOTES
"Jorge is a 68 year old who is being evaluated via a billable telephone visit.      What phone number would you like to be contacted at? [phone]  How would you like to obtain your AVS? Mail a copy    Distant Location (provider location):  Off-site    Assessment & Plan     Type 2 diabetes mellitus without complication, without long-term current use of insulin (H)    He will go to the pharmacy to get refills.    He has 1/10/24 appt.               BMI:   Estimated body mass index is 27.86 kg/m  as calculated from the following:    Height as of 9/20/23: 1.607 m (5' 3.25\").    Weight as of 9/20/23: 71.9 kg (158 lb 8 oz).           Omar Chaney MD  [facility] is a 68 year old, presenting for the following health issues:  Diabetes (Have not received any of the medications for the last month. )      11/28/2023     9:51 AM   Additional Questions   Roomed by Jeny MAST MA   Accompanied by SELF       History of Present Illness       Diabetes:   He presents for follow up of diabetes.  He is checking home blood glucose two times daily.   He checks blood glucose before meals.  Blood glucose is sometimes over 200 and never under 70. He is aware of hypoglycemia symptoms including none.    He has no concerns regarding his diabetes at this time.   He is not experiencing numbness or burning in feet, excessive thirst, blurry vision, weight changes or redness, sores or blisters on feet.                   Pioglitazone was increased to 30 mg in July.  A1c was 12.8 then.    Fingersticks 120-210.    He has been out of medications for 1 month.    Current Outpatient Medications   Medication Sig Dispense Refill    albuterol (PROAIR HFA/PROVENTIL HFA/VENTOLIN HFA) 108 (90 Base) MCG/ACT inhaler Inhale 2 puffs into the lungs every 4 hours as needed 18 g 11    amLODIPine (NORVASC) 2.5 MG tablet Take 1 tablet (2.5 mg) by mouth daily 30 tablet 11    atorvastatin (LIPITOR) 20 MG tablet Take 1 tablet (20 " mg) by mouth daily 30 tablet 11    blood glucose (NO BRAND SPECIFIED) lancets standard Use to test blood sugar 1 time daily or as directed. 50 each 11    blood glucose (NO BRAND SPECIFIED) test strip Use to test blood sugar 1 time1 daily or as directed. 50 strip 11    blood glucose monitoring (NO BRAND SPECIFIED) meter device kit Use to test blood sugar 1 time daily or as directed. 1 kit 0    glipiZIDE (GLUCOTROL XL) 5 MG 24 hr tablet Take 2 tablets (10 mg) by mouth daily (with breakfast) 60 tablet 11    Global Inject Ease Lancets 30G MISC       metFORMIN (GLUCOPHAGE) 500 MG tablet TAKE 2 TABLETS(1000 MG) BY MOUTH TWICE DAILY WITH MEALS 120 tablet 11    pioglitazone (ACTOS) 30 MG tablet Take 1 tablet (30 mg) by mouth daily 30 tablet 11         Review of Systems   No fever.      Objective           Vitals:  No vitals were obtained today due to virtual visit.    Physical Exam     PSYCH: Alert and oriented times 3; coherent speech, normal   rate and volume, able to articulate logical thoughts, able   to abstract reason, no tangential thoughts, no hallucinations   or delusions  His affect is normal  RESP: No cough, no audible wheezing, able to talk in full sentences  Remainder of exam unable to be completed due to telephone visits                Phone call duration: 9 minutes

## 2024-01-02 ENCOUNTER — PATIENT OUTREACH (OUTPATIENT)
Dept: GERIATRIC MEDICINE | Facility: CLINIC | Age: 69
End: 2024-01-02
Payer: MEDICARE

## 2024-01-10 ENCOUNTER — OFFICE VISIT (OUTPATIENT)
Dept: FAMILY MEDICINE | Facility: CLINIC | Age: 69
End: 2024-01-10
Attending: FAMILY MEDICINE
Payer: MEDICARE

## 2024-01-10 VITALS
BODY MASS INDEX: 26.95 KG/M2 | WEIGHT: 152.12 LBS | HEIGHT: 63 IN | SYSTOLIC BLOOD PRESSURE: 118 MMHG | HEART RATE: 86 BPM | DIASTOLIC BLOOD PRESSURE: 76 MMHG | TEMPERATURE: 97.3 F | OXYGEN SATURATION: 97 % | RESPIRATION RATE: 16 BRPM

## 2024-01-10 DIAGNOSIS — I10 ESSENTIAL HYPERTENSION WITH GOAL BLOOD PRESSURE LESS THAN 140/90: ICD-10-CM

## 2024-01-10 DIAGNOSIS — Z23 NEED FOR VACCINATION: ICD-10-CM

## 2024-01-10 DIAGNOSIS — E11.9 TYPE 2 DIABETES MELLITUS WITHOUT COMPLICATION, WITHOUT LONG-TERM CURRENT USE OF INSULIN (H): Primary | ICD-10-CM

## 2024-01-10 LAB
CREAT UR-MCNC: 30.5 MG/DL
HBA1C MFR BLD: 14.6 % (ref 0–5.6)
MICROALBUMIN UR-MCNC: <12 MG/L
MICROALBUMIN/CREAT UR: NORMAL MG/G{CREAT}

## 2024-01-10 PROCEDURE — 82043 UR ALBUMIN QUANTITATIVE: CPT | Performed by: FAMILY MEDICINE

## 2024-01-10 PROCEDURE — 36415 COLL VENOUS BLD VENIPUNCTURE: CPT | Performed by: FAMILY MEDICINE

## 2024-01-10 PROCEDURE — 90480 ADMN SARSCOV2 VAC 1/ONLY CMP: CPT | Performed by: FAMILY MEDICINE

## 2024-01-10 PROCEDURE — 82570 ASSAY OF URINE CREATININE: CPT | Performed by: FAMILY MEDICINE

## 2024-01-10 PROCEDURE — 91320 SARSCV2 VAC 30MCG TRS-SUC IM: CPT | Performed by: FAMILY MEDICINE

## 2024-01-10 PROCEDURE — 99214 OFFICE O/P EST MOD 30 MIN: CPT | Performed by: FAMILY MEDICINE

## 2024-01-10 PROCEDURE — 83036 HEMOGLOBIN GLYCOSYLATED A1C: CPT | Performed by: FAMILY MEDICINE

## 2024-01-10 RX ORDER — PIOGLITAZONEHYDROCHLORIDE 45 MG/1
45 TABLET ORAL DAILY
Qty: 30 TABLET | Refills: 11 | Status: SHIPPED | OUTPATIENT
Start: 2024-01-10 | End: 2024-01-10

## 2024-01-10 RX ORDER — RESPIRATORY SYNCYTIAL VIRUS VACCINE 120MCG/0.5
0.5 KIT INTRAMUSCULAR ONCE
Qty: 1 EACH | Refills: 0 | Status: CANCELLED | OUTPATIENT
Start: 2024-01-10 | End: 2024-01-10

## 2024-01-10 RX ORDER — PIOGLITAZONEHYDROCHLORIDE 45 MG/1
45 TABLET ORAL DAILY
Qty: 30 TABLET | Refills: 11 | Status: SHIPPED | OUTPATIENT
Start: 2024-01-10

## 2024-01-10 NOTE — COMMUNITY RESOURCES LIST (ENGLISH)
01/10/2024   [facility]  N/A  For questions about this resource list or additional care needs, please contact your primary care clinic or care manager.  Phone: [phone]   Email: N/A   Address: [address] [place] [address]   Hours: N/A        Financial Stability       Utility payment assistance  1  [employer] - Emergency Assistance [place] - Utility payment assistance Distance: 2.67 miles      In-Person, Phone/Virtual   [address] [place] [address]  Language: English, Namibian  Hours: Mon - Fri 9:00 AM - 4:00 PM  Fees: Free   Phone: (476) 568-9240 Email: info@[employer] Website: http://www.[employer]     2  [facility] Distance: 4.14 miles      In-Person, Phone/Virtual   [address] [place] [address]  Language: English  Hours: Mon - Fri 8:00 AM - 12:00 PM , Mon - Fri 1:00 PM - 4:00 PM  Fees: Free   Phone: (680) 749-8993 Email: mikayla@[facility] Website: https://[facility].org/[facility] [place] [facility]/socialservices-[facility]/          Food and Nutrition       Food pantry  3  [employer] Distance: 1.47 miles      In-Person, Delivery, Pickup   [address] [place] [address]  Language: English  Hours: Tue 8:00 AM - 4:00 PM , Wed 5:30 PM - 6:30 PM  Fees: Free   Phone: (445) 251-8218 Email: info@[employer] Website: http://www.[employer]     4  [employer] Distance: 2.67 miles      In-Person, Delivery, Pickup   [address] [place] [address]  Language: English, Namibian  Hours: Mon - Fri 8:45 AM - 11:30 AM , Mon - Fri 1:00 PM - 3:30 PM  Fees: Free   Phone: (945) 182-4821 Email: info@[employer] Website: http://www.[employer].org     SNAP application assistance  5  [facility] Distance: 4.34 miles      In-Person, [place]  [address]  Language: English  Hours: Mon - Fri 8:00 AM - 4:30 PM  Fees: Free   Phone: (189) 653-2878 Email: paulette@[facility]. Website: https://www.[facility]./787/Economic-Support     6  [facility] Distance: 5.65 miles      In-Person, Phone/Virtual   [address] [place] [address]  Language: English  Hours: Mon - Fri 8:00 AM - 4:30 PM  Fees: Free   Phone: (877) 970-8054 Email: paulette@[facility] Website: https://www.[facility]/Facilities/Facility/Details/[facility]-22     Soup kitchen or free meals  7  [facility] Distance: 2.56 miles      In-Person, Pickup   [address] [place] [address]  Language: English  Hours: Mon - Thu 5:00 PM - 6:30 PM  Fees: Free   Phone: (180) 241-4333 Email: office@[employer] Website: http://[employer].org/     8  [facility] - [employer] Distance: 2.78 miles      Pickup   [address] [place] [address]  Language: English  Hours: Mon 3:00 PM - 8:00 PM , Tue - Fri 5:00 AM - 8:00 PM , Sat 7:00 AM - 2:00 PM  Fees: Free   Phone: (489) 366-8323 Website: https://communityed.[facility].org/          Transportation       Free or low-cost transportation  9  [employer] Distance: 3.65 miles      [address] [place] [address]  Language: English, Cape Verdean, Hmong, Martiniquais, Romanian  Hours: Mon - Sun 5:00 AM - 10:00 PM  Fees: Self Pay   Phone: (261) 264-9733 Email: maximal[employer]_mobility@[employer] Website: https://www.[facility].info/Providers/[employer]/Transportation/1?pos=9     10  [facility] -  Office - [facility] and [facility] - Gas vouchers and transportation assistance - Free or low-cost transportation Distance: 4.14 miles      In-Person, Phone/Virtual   [address] [place] [address]  Language: English  Hours: Mon - Fri 8:00 AM - 12:00 PM , Mon - Fri 1:00 PM - 4:00 PM  Fees: Free   Phone: (537) 308-6196 Email: mikayla@[facility] Website: https://[facility]/social-services-office-[place]/     Transportation to medical appointments  11  [employer] Distance: 3.65 miles      [address] [place] [address]  Language: English, Belarusian, Hmong, Cypriot, English  Hours: Mon - Sun 5:00 AM - 10:00 PM  Fees: Insurance, Self Pay   Phone: (652) 176-7570 Email: maximalcare_mobility@[employer] Website: https://www.[facility].info/Providers/[employer]/Transportation/1?pos=9     12  [facility] - Non-Emergency Medical Transportation Distance: 7.47 miles      In-Person   [address] [place] [address]  Language: English  Hours: Mon - Fri 8:00 AM - 4:00 PM Appt. Only  Fees: Self Pay   Phone: (861) 518-2011 Website: http://www.[facility].org/Medical-Services/Emergency-medical-services/Non-emergency-transportation/          Important Numbers & Websites       Emergency Services   911  [facility] Services   311  Poison Control   (828) 420-3241  Suicide Prevention Lifeline   (646) 568-2425 (TALK)  Child Abuse Hotline   (930) 181-8235 (4-A-Child)  Sexual Assault Hotline   (747) 783-9489 (HOPE)  National Runaway Safeline   (717) 237-5013 (RUNAWAY)  All-Options Talkline   (267) 753-1231  Substance Abuse Referral   (121) 478-1058 (HELP)

## 2024-01-10 NOTE — PROGRESS NOTES
"  Assessment & Plan     Type 2 diabetes mellitus without complication, without long-term current use of insulin (H)    Not controlled.    Increase pioglitazone to 45 mg.    Recheck in 4 months.      - PRIMARY CARE FOLLOW-UP SCHEDULING  - Hemoglobin A1c  - Albumin Random Urine Quantitative with Creat Ratio  - Hemoglobin A1c  - blood glucose (NO BRAND SPECIFIED) lancets standard  Dispense: 50 each; Refill: 11  - PRIMARY CARE FOLLOW-UP SCHEDULING  - pioglitazone (ACTOS) 45 MG tablet  Dispense: 30 tablet; Refill: 11    Essential hypertension with goal blood pressure less than 140/90    Stable.      Need for vaccination    - COVID-19 12+ (2023-24) (PFIZER)      Discussed colonoscopy:  pt will think about it.          35 minutes spent by me on the date of the encounter doing chart review, review of test results, and patient visit        BMI:   Estimated body mass index is 26.73 kg/m  as calculated from the following:    Height as of this encounter: 1.607 m (5' 3.25\").    Weight as of this encounter: 69 kg (152 lb 1.9 oz).           Omar Chaney MD  [facility] is a 68 year old, presenting for the following health issues:  Diabetes        1/10/2024    10:19 AM   Additional Questions   Roomed by Jeny MAST MA   Accompanied by Niece       History of Present Illness       Diabetes:   He presents for follow up of diabetes.  He is checking home blood glucose one time daily.   He checks blood glucose before meals.  Blood glucose is never over 200 and never under 70. He is aware of hypoglycemia symptoms including dizziness and blurred vision.    He has no concerns regarding his diabetes at this time.  He is having numbness in feet, burning in feet, redness, sores, or blisters on feet, excessive thirst and blurry vision.                      Fingersticks 200 - 270    He decreased rice intake.    Had colonoscopy in [place] in 2014.    Current Outpatient Medications   Medication Sig Dispense " "Refill    albuterol (PROAIR HFA/PROVENTIL HFA/VENTOLIN HFA) 108 (90 Base) MCG/ACT inhaler Inhale 2 puffs into the lungs every 4 hours as needed 18 g 11    amLODIPine (NORVASC) 2.5 MG tablet Take 1 tablet (2.5 mg) by mouth daily 30 tablet 11    atorvastatin (LIPITOR) 20 MG tablet Take 1 tablet (20 mg) by mouth daily 30 tablet 11    blood glucose (NO BRAND SPECIFIED) lancets standard Use to test blood sugar 1 time daily or as directed. 50 each 11    blood glucose (NO BRAND SPECIFIED) test strip Use to test blood sugar 1 time1 daily or as directed. 50 strip 11    blood glucose monitoring (NO BRAND SPECIFIED) meter device kit Use to test blood sugar 1 time daily or as directed. 1 kit 0    glipiZIDE (GLUCOTROL XL) 5 MG 24 hr tablet Take 2 tablets (10 mg) by mouth daily (with breakfast) 60 tablet 11    Global Inject Ease Lancets 30G MISC       metFORMIN (GLUCOPHAGE) 500 MG tablet TAKE 2 TABLETS(1000 MG) BY MOUTH TWICE DAILY WITH MEALS 120 tablet 11    pioglitazone (ACTOS) 45 MG tablet Take 1 tablet (45 mg) by mouth daily 30 tablet 11         Review of Systems   No fever.      Objective    /76   Pulse 86   Temp 97.3  F (36.3  C) (Temporal)   Resp 16   Ht 1.607 m (5' 3.25\")   Wt 69 kg (152 lb 1.9 oz)   SpO2 97%   BMI 26.73 kg/m    Body mass index is 26.73 kg/m .  Physical Exam   Heart normal  Lungs normal    A1c 14.6, had been 12.8                      "

## 2024-01-15 NOTE — PROGRESS NOTES
[facility] Six-Month Telephone Assessment    6 month telephone assessment completed on 01/02/24.    ER visits: No  Hospitalizations: No  TCU stays: No  Significant health status changes: None  Falls/Injuries: No  ADL/IADL changes: No  Changes in services: No    Caregiver Assessment follow up:  NA    Goals: See POC in chart for goal progress documentation.      Will see member in 6 months for an annual health risk assessment.   Encouraged member to call CC with any questions or concerns in the meantime.     Skye AMOR  [facility]  [phone]

## 2024-03-01 ENCOUNTER — OFFICE VISIT (OUTPATIENT)
Dept: FAMILY MEDICINE | Facility: CLINIC | Age: 69
End: 2024-03-01
Payer: MEDICARE

## 2024-03-01 VITALS
TEMPERATURE: 98.2 F | DIASTOLIC BLOOD PRESSURE: 88 MMHG | OXYGEN SATURATION: 99 % | WEIGHT: 158 LBS | SYSTOLIC BLOOD PRESSURE: 158 MMHG | RESPIRATION RATE: 20 BRPM | BODY MASS INDEX: 28 KG/M2 | HEART RATE: 67 BPM | HEIGHT: 63 IN

## 2024-03-01 DIAGNOSIS — H26.9 CATARACT OF LEFT EYE, UNSPECIFIED CATARACT TYPE: ICD-10-CM

## 2024-03-01 DIAGNOSIS — Z01.818 PREOPERATIVE EXAMINATION: Primary | ICD-10-CM

## 2024-03-01 PROCEDURE — 99214 OFFICE O/P EST MOD 30 MIN: CPT | Performed by: FAMILY MEDICINE

## 2024-03-01 RX ORDER — RESPIRATORY SYNCYTIAL VIRUS VACCINE 120MCG/0.5
0.5 KIT INTRAMUSCULAR ONCE
Qty: 1 EACH | Refills: 0 | Status: CANCELLED | OUTPATIENT
Start: 2024-03-01 | End: 2024-03-01

## 2024-03-01 NOTE — PROGRESS NOTES
Preoperative Evaluation  [address]  [place] [address]  Phone: [phone]  Fax: [phone]  Primary Provider: Abhishek Chaney  Pre-op Performing Provider:    ABHISHEK CHANEY  VIDEO,   Mar 1, 2024       oJrge is a 68 year old, presenting for the following:  Pre-Op Exam        3/1/2024    12:42 PM   Additional Questions   Roomed by Alex MAST   Accompanied by madhavi   Failed to redirect to the Timeline version of the [employer] SmartLink.  Surgical Information  Surgery/Procedure: Cataract Surgery left eye only  Surgery Location: [facility], [place]  Surgeon: Yulisa Fields  Surgery Date: 3/15/24   Time of Surgery: TBD  Where patient plans to recover: At home with family  Fax number for surgical facility: [phone]    Assessment & Plan     The proposed surgical procedure is considered LOW risk.    Preoperative examination      Cataract of left eye, unspecified cataract type            Antiplatelet or Anticoagulation Medication Instructions   - Patient is on no antiplatelet or anticoagulation medications.    Additional Medication Instructions  Patient is to take all scheduled medications on the day of surgery EXCEPT for modifications listed below:   - metformin: HOLD day of surgery.   - sulfonylurea (e.g. glyburide, glipizide): HOLD day of surgery   - Thiazolidinedione (e.g. rosiglitazone, pioglitazone): HOLD day of surgery.    Recommendation  APPROVAL GIVEN to proceed with proposed procedure, without further diagnostic evaluation.          Subjective       HPI related to upcoming procedure: Left cataract.  Diabetes, taking medications.  Hypertension.        3/1/2024    12:59 PM   Preop Questions   1. Have you ever had a heart attack or stroke? No   2. Have you ever had surgery on your heart or blood vessels, such as a stent placement, a coronary artery bypass, or surgery on an artery in your head, neck, heart, or legs? No   3. Do you  have chest pain with activity? YES    4. Do you have a history of  heart failure? YES    5. Do you currently have a cold, bronchitis or symptoms of other infection? No   6. Do you have a cough, shortness of breath, or wheezing? No   7. Do you or anyone in your family have previous history of blood clots? No   8. Do you or does anyone in your family have a serious bleeding problem such as prolonged bleeding following surgeries or cuts? No   9. Have you ever had problems with anemia or been told to take iron pills? No   10. Have you had any abnormal blood loss such as black, tarry or bloody stools? No   11. Have you ever had a blood transfusion? No   12. Are you willing to have a blood transfusion if it is medically needed before, during, or after your surgery? Yes   13. Have you or any of your relatives ever had problems with anesthesia? No   14. Do you have sleep apnea, excessive snoring or daytime drowsiness? No   15. Do you have any artifical heart valves or other implanted medical devices like a pacemaker, defibrillator, or continuous glucose monitor? No   16. Do you have artificial joints? No   17. Are you allergic to latex? No           Preoperative Review of    reviewed - no record of controlled substances prescribed.          Patient Active Problem List    Diagnosis Date Noted    Memory loss 10/18/2019     Priority: Medium    Type 2 diabetes mellitus without complication, without long-term current use of insulin (H) 01/09/2018     Priority: Medium    Primary insomnia 01/09/2018     Priority: Medium    Mild intermittent asthma without complication      Priority: Medium     Created by Conversion        Mixed hyperlipidemia 09/23/2016     Priority: Medium    Essential hypertension with goal blood pressure less than 140/90 09/23/2016     Priority: Medium      No past medical history on file.  No past surgical history on file.  Current Outpatient Medications   Medication Sig Dispense Refill    albuterol  "(PROAIR HFA/PROVENTIL HFA/VENTOLIN HFA) 108 (90 Base) MCG/ACT inhaler Inhale 2 puffs into the lungs every 4 hours as needed 18 g 11    amLODIPine (NORVASC) 2.5 MG tablet Take 1 tablet (2.5 mg) by mouth daily 30 tablet 11    atorvastatin (LIPITOR) 20 MG tablet Take 1 tablet (20 mg) by mouth daily 30 tablet 11    blood glucose (NO BRAND SPECIFIED) lancets standard Use to test blood sugar 1 time daily or as directed. 50 each 11    blood glucose (NO BRAND SPECIFIED) test strip Use to test blood sugar 1 time1 daily or as directed. 50 strip 11    blood glucose monitoring (NO BRAND SPECIFIED) meter device kit Use to test blood sugar 1 time daily or as directed. 1 kit 0    glipiZIDE (GLUCOTROL XL) 5 MG 24 hr tablet Take 2 tablets (10 mg) by mouth daily (with breakfast) 60 tablet 11    Global Inject Ease Lancets 30G MISC       metFORMIN (GLUCOPHAGE) 500 MG tablet TAKE 2 TABLETS(1000 MG) BY MOUTH TWICE DAILY WITH MEALS 120 tablet 11    pioglitazone (ACTOS) 45 MG tablet Take 1 tablet (45 mg) by mouth daily 30 tablet 11       No Known Allergies     Social History     Tobacco Use    Smoking status: Former     Types: Cigars     Quit date: 2006     Years since quittin.8     Passive exposure: Never    Smokeless tobacco: Current     Types: Chew    Tobacco comments:     sometimes he chews betel nut w/ tobacco   Substance Use Topics    Alcohol use: No       History   Drug Use No         Review of Systems      Objective    BP (!) 158/88   Pulse 67   Temp 98.2  F (36.8  C) (Oral)   Resp 20   Ht 1.595 m (5' 2.8\")   Wt 71.7 kg (158 lb)   SpO2 99%   BMI 28.17 kg/m     Estimated body mass index is 28.17 kg/m  as calculated from the following:    Height as of this encounter: 1.595 m (5' 2.8\").    Weight as of this encounter: 71.7 kg (158 lb).  Physical Exam  Eyes: EOM full, pupils normal, conjunctivae normal.   Left cataract  Ears: TM's and canals normal  Oropharynx: No lower teeth.  Betel nut stain of upper teeth  Neck: " supple without adenopathy or thyromegaly  Lungs: normal  Heart: regular rhythm, normal rate, no murmur  Abdomen: no HSM, mass or tenderness  Extremities: FROM, normal strength and sensation      Recent Labs   Lab Test 01/10/24  1031 07/28/23  1510 03/31/23  1516 11/25/22  1627 08/22/22  1627   NA  --   --  134*  --  130*   POTASSIUM  --   --  4.7  --  5.0   CR  --   --  0.82  --  0.71   A1C 14.6* 12.8* <15.0*   < > 14.6*    < > = values in this interval not displayed.        Diagnostics  No labs were ordered during this visit.   No EKG required for low risk surgery (cataract, skin procedure, breast biopsy, etc).    Revised Cardiac Risk Index (RCRI)  The patient has the following serious cardiovascular risks for perioperative complications:   - No serious cardiac risks = 0 points     RCRI Interpretation: 0 points: Class I (very low risk - 0.4% complication rate)         Signed Electronically by: Omar Chaney MD  Copy of this evaluation report is provided to requesting physician.

## 2024-03-29 ENCOUNTER — PATIENT OUTREACH (OUTPATIENT)
Dept: GERIATRIC MEDICINE | Facility: CLINIC | Age: 69
End: 2024-03-29
Payer: MEDICARE

## 2024-03-29 NOTE — PROGRESS NOTES
[facility] Care Coordination Contact    CHW spoke w/ mbr via  to get updates on MA renewal. Mbr indicated that the paperwork was submitted a moth ago and that a letter on MA eligibility was sent from the [facility] for MA renewal for this year. CHW contact info was provided.     RICHARD Raymundo  [facility]  [phone]

## 2024-05-10 ENCOUNTER — OFFICE VISIT (OUTPATIENT)
Dept: FAMILY MEDICINE | Facility: CLINIC | Age: 69
End: 2024-05-10
Attending: FAMILY MEDICINE
Payer: MEDICARE

## 2024-05-10 VITALS
DIASTOLIC BLOOD PRESSURE: 86 MMHG | BODY MASS INDEX: 28.54 KG/M2 | HEIGHT: 63 IN | WEIGHT: 161.04 LBS | RESPIRATION RATE: 18 BRPM | OXYGEN SATURATION: 99 % | HEART RATE: 78 BPM | SYSTOLIC BLOOD PRESSURE: 148 MMHG | TEMPERATURE: 99.1 F

## 2024-05-10 DIAGNOSIS — E11.9 TYPE 2 DIABETES MELLITUS WITHOUT COMPLICATION, WITHOUT LONG-TERM CURRENT USE OF INSULIN (H): Primary | ICD-10-CM

## 2024-05-10 DIAGNOSIS — I10 ESSENTIAL HYPERTENSION WITH GOAL BLOOD PRESSURE LESS THAN 140/90: ICD-10-CM

## 2024-05-10 LAB — HBA1C MFR BLD: 8.8 % (ref 0–5.6)

## 2024-05-10 PROCEDURE — 83036 HEMOGLOBIN GLYCOSYLATED A1C: CPT | Performed by: FAMILY MEDICINE

## 2024-05-10 PROCEDURE — 80053 COMPREHEN METABOLIC PANEL: CPT | Performed by: FAMILY MEDICINE

## 2024-05-10 PROCEDURE — 36415 COLL VENOUS BLD VENIPUNCTURE: CPT | Performed by: FAMILY MEDICINE

## 2024-05-10 PROCEDURE — 99214 OFFICE O/P EST MOD 30 MIN: CPT | Performed by: FAMILY MEDICINE

## 2024-05-10 PROCEDURE — G2211 COMPLEX E/M VISIT ADD ON: HCPCS | Performed by: FAMILY MEDICINE

## 2024-05-10 NOTE — LETTER
May 29, 2024      Htun Naung  [address] [place] [address]        Hi Htun,    Your labs are good.    Resulted Orders   Hemoglobin A1c   Result Value Ref Range    Hemoglobin A1C 8.8 (H) 0.0 - 5.6 %   Comprehensive metabolic panel (BMP + Alb, Alk Phos, ALT, AST, Total. Bili, TP)   Result Value Ref Range    Sodium 139 135 - 145 mmol/L      Comment:      Reference intervals for this test were updated on 09/26/2023 to more accurately reflect our healthy population. There may be differences in the flagging of prior results with similar values performed with this method. Interpretation of those prior results can be made in the context of the updated reference intervals.     Potassium 4.5 3.4 - 5.3 mmol/L    Carbon Dioxide (CO2) 28 22 - 29 mmol/L    Anion Gap 9 7 - 15 mmol/L    Urea Nitrogen 16.9 8.0 - 23.0 mg/dL    Creatinine 0.73 0.67 - 1.17 mg/dL    GFR Estimate >90 >60 mL/min/1.73m2    Calcium 9.8 8.8 - 10.2 mg/dL    Chloride 102 98 - 107 mmol/L    Glucose 170 (H) 70 - 99 mg/dL    Alkaline Phosphatase 67 40 - 150 U/L      Comment:      Reference intervals for this test were updated on 11/14/2023 to more accurately reflect our healthy population. There may be differences in the flagging of prior results with similar values performed with this method. Interpretation of those prior results can be made in the context of the updated reference intervals.    AST 19 0 - 45 U/L      Comment:      Reference intervals for this test were updated on 6/12/2023 to more accurately reflect our healthy population. There may be differences in the flagging of prior results with similar values performed with this method. Interpretation of those prior results can be made in the context of the updated reference intervals.    ALT <5 0 - 70 U/L      Comment:      Reference intervals for this test were updated on 6/12/2023 to more accurately reflect our healthy population. There may be differences in the flagging of prior results with similar  values performed with this method. Interpretation of those prior results can be made in the context of the updated reference intervals.      Protein Total 7.5 6.4 - 8.3 g/dL    Albumin 4.6 3.5 - 5.2 g/dL    Bilirubin Total 0.5 <=1.2 mg/dL       If you have any questions or concerns, please call the clinic at the number listed above.       Sincerely,      Omar Chaney MD

## 2024-05-11 LAB
ALBUMIN SERPL BCG-MCNC: 4.6 G/DL (ref 3.5–5.2)
ALP SERPL-CCNC: 67 U/L (ref 40–150)
ALT SERPL W P-5'-P-CCNC: <5 U/L (ref 0–70)
ANION GAP SERPL CALCULATED.3IONS-SCNC: 9 MMOL/L (ref 7–15)
AST SERPL W P-5'-P-CCNC: 19 U/L (ref 0–45)
BILIRUB SERPL-MCNC: 0.5 MG/DL
BUN SERPL-MCNC: 16.9 MG/DL (ref 8–23)
CALCIUM SERPL-MCNC: 9.8 MG/DL (ref 8.8–10.2)
CHLORIDE SERPL-SCNC: 102 MMOL/L (ref 98–107)
CREAT SERPL-MCNC: 0.73 MG/DL (ref 0.67–1.17)
DEPRECATED HCO3 PLAS-SCNC: 28 MMOL/L (ref 22–29)
EGFRCR SERPLBLD CKD-EPI 2021: >90 ML/MIN/1.73M2
GLUCOSE SERPL-MCNC: 170 MG/DL (ref 70–99)
POTASSIUM SERPL-SCNC: 4.5 MMOL/L (ref 3.4–5.3)
PROT SERPL-MCNC: 7.5 G/DL (ref 6.4–8.3)
SODIUM SERPL-SCNC: 139 MMOL/L (ref 135–145)

## 2024-05-16 ENCOUNTER — TELEPHONE (OUTPATIENT)
Dept: FAMILY MEDICINE | Facility: CLINIC | Age: 69
End: 2024-05-16
Payer: MEDICARE

## 2024-05-16 NOTE — PROGRESS NOTES
"  Assessment & Plan     Type 2 diabetes mellitus without complication, without long-term current use of insulin (H)    Greatly improved.    Continue metformin, glipizide, pioglitazone.    Careful diet.    Recheck in 4 months.      - PRIMARY CARE FOLLOW-UP SCHEDULING  - blood glucose (NO BRAND SPECIFIED) test strip  Dispense: 50 strip; Refill: 11  - Hemoglobin A1c  - Hemoglobin A1c  - PRIMARY CARE FOLLOW-UP SCHEDULING    Essential hypertension with goal blood pressure less than 140/90    Not controlled.    Continue amlodipine.    - Comprehensive metabolic panel (BMP + Alb, Alk Phos, ALT, AST, Total. Bili, TP)  - Comprehensive metabolic panel (BMP + Alb, Alk Phos, ALT, AST, Total. Bili, TP)      The longitudinal plan of care for the diagnosis(es)/condition(s) as documented were addressed during this visit. Due to the added complexity in care, I will continue to support Jorge in the subsequent management and with ongoing continuity of care.            BMI  Estimated body mass index is 28.64 kg/m  as calculated from the following:    Height as of this encounter: 1.597 m (5' 2.87\").    Weight as of this encounter: 73 kg (161 lb 0.6 oz).             Subjective   Htzan is a 69 year old, presenting for the following health issues:  Follow Up (Diabetes)    History of Present Illness       Diabetes:   He presents for follow up of diabetes.  He is checking home blood glucose two times daily.   He checks blood glucose before and after meals.  Blood glucose is never over 200 and never under 70. He is aware of hypoglycemia symptoms including dizziness and blurred vision.    He has no concerns regarding his diabetes at this time.  He is having blurry vision.                      Fingerstick today 172.    Had milk and bread today.    Did not have cataract surgery because glucose was too high.    Current Outpatient Medications   Medication Sig Dispense Refill    albuterol (PROAIR HFA/PROVENTIL HFA/VENTOLIN HFA) 108 (90 Base) MCG/ACT " "inhaler Inhale 2 puffs into the lungs every 4 hours as needed 18 g 11    amLODIPine (NORVASC) 2.5 MG tablet Take 1 tablet (2.5 mg) by mouth daily 30 tablet 11    atorvastatin (LIPITOR) 20 MG tablet Take 1 tablet (20 mg) by mouth daily 30 tablet 11    blood glucose (NO BRAND SPECIFIED) lancets standard Use to test blood sugar 1 time daily or as directed. 50 each 11    blood glucose (NO BRAND SPECIFIED) test strip Use to test blood sugar 1 time1 daily or as directed. 50 strip 11    blood glucose monitoring (NO BRAND SPECIFIED) meter device kit Use to test blood sugar 1 time daily or as directed. 1 kit 0    glipiZIDE (GLUCOTROL XL) 5 MG 24 hr tablet Take 2 tablets (10 mg) by mouth daily (with breakfast) 60 tablet 11    Global Inject Ease Lancets 30G MISC       metFORMIN (GLUCOPHAGE) 500 MG tablet TAKE 2 TABLETS(1000 MG) BY MOUTH TWICE DAILY WITH MEALS 120 tablet 11    pioglitazone (ACTOS) 45 MG tablet Take 1 tablet (45 mg) by mouth daily 30 tablet 11             Objective    BP (!) 148/86   Pulse 78   Temp 99.1  F (37.3  C) (Oral)   Resp 18   Ht 1.597 m (5' 2.87\")   Wt 73 kg (161 lb 0.6 oz)   SpO2 99%   BMI 28.64 kg/m    Body mass index is 28.64 kg/m .  Physical Exam     Heart normal  Lungs normal    A1c 8.8, had been 14.6          Signed Electronically by: Omar Chaney MD    "

## 2024-05-16 NOTE — TELEPHONE ENCOUNTER
General Call    Contacts         Type Contact Phone/Fax    05/16/2024 12:23 PM CDT Phone (Incoming) Vanessa (Niece/Nephew) [phone]     PCA (not on C2C)          Reason for Call:  Vanessa (patient's PCA/niece) is requesting a letter stating they can move forward with cataract surgery with the improvement of pts blood sugars. [facility] are requesting the letter.     What are your questions or concerns:  Is PCP willing to provide letter?     Date of last appointment with provider:   Last ofv 05/10 with .     Okay to leave a detailed message?: Yes at Other phone number:  [phone]    TC did offer to schedule a pre-op appointment though patient is not scheduled for surgery yet.

## 2024-05-28 ENCOUNTER — PATIENT OUTREACH (OUTPATIENT)
Dept: GERIATRIC MEDICINE | Facility: CLINIC | Age: 69
End: 2024-05-28
Payer: MEDICARE

## 2024-05-28 NOTE — LETTER
June 14, 2024    AMBER AUSTIN  [address] [facility] [place] [place] [facility] [address]        Dear Amber:    As a member of [facility]'s MSC[facility] program, we offer a health risk assessment at no cost to you. I know you don't want to have the assessment right now. If you change your mind, please call me at the number below.    Who performs the health risk assessment?  A [facility] Care Coordinator performs the assessment. Our Care Coordinators can also help you understand your benefits. They can tell you about services to aid you at home, such as managing your care with your doctors if your health worsens.    Our Care Coordinators are here for you if you need:  Support for activities you used to do by yourself (including making meals, bathing and paying bills)  Equipment for bathroom or home safety  Help finding a new place to live  Information on staying healthy, preventing falls and immunizations    Questions?  If you have questions, or you would like to do he assessment, call me at [phone]. TTY users call [phone]. I'm here from 8am to 5pm. I may reach out to you again soon.       Sincerely,         MT Suh  [phone]  [email or web address]            \<[id number] accepted  [id number]>    [id number] (21/2021)

## 2024-06-06 NOTE — PROGRESS NOTES
Care coordinator went out to Htun's home to complete annual HRA. Htun refused to get out of bed to complete assessment.  and granddaughter were also present when this happened.     Skye Machuca [facility]  [phone]

## 2024-06-14 NOTE — PROGRESS NOTES
"Per CC, mailed client a \"Refusal of Home Visit\" letter.    Omega Ho  Care Management Specialist  [facility]  [phone]    "

## 2024-06-19 ENCOUNTER — OFFICE VISIT (OUTPATIENT)
Dept: FAMILY MEDICINE | Facility: CLINIC | Age: 69
End: 2024-06-19
Payer: MEDICARE

## 2024-06-19 ENCOUNTER — VIRTUAL VISIT (OUTPATIENT)
Dept: INTERPRETER SERVICES | Facility: CLINIC | Age: 69
End: 2024-06-19

## 2024-06-19 VITALS
RESPIRATION RATE: 16 BRPM | TEMPERATURE: 99 F | SYSTOLIC BLOOD PRESSURE: 136 MMHG | HEART RATE: 65 BPM | BODY MASS INDEX: 29.38 KG/M2 | WEIGHT: 165.8 LBS | OXYGEN SATURATION: 98 % | HEIGHT: 63 IN | DIASTOLIC BLOOD PRESSURE: 80 MMHG

## 2024-06-19 DIAGNOSIS — J45.20 MILD INTERMITTENT ASTHMA WITHOUT COMPLICATION: ICD-10-CM

## 2024-06-19 DIAGNOSIS — E11.9 TYPE 2 DIABETES MELLITUS WITHOUT COMPLICATION, WITHOUT LONG-TERM CURRENT USE OF INSULIN (H): Primary | ICD-10-CM

## 2024-06-19 PROCEDURE — 99213 OFFICE O/P EST LOW 20 MIN: CPT | Performed by: FAMILY MEDICINE

## 2024-06-19 PROCEDURE — T1013 SIGN LANG/ORAL INTERPRETER: HCPCS | Mod: U4 | Performed by: INTERPRETER

## 2024-06-19 RX ORDER — RESPIRATORY SYNCYTIAL VIRUS VACCINE 120MCG/0.5
0.5 KIT INTRAMUSCULAR ONCE
Qty: 1 EACH | Refills: 0 | Status: CANCELLED | OUTPATIENT
Start: 2024-06-19 | End: 2024-06-19

## 2024-06-19 ASSESSMENT — ASTHMA QUESTIONNAIRES
QUESTION_2 LAST FOUR WEEKS HOW OFTEN HAVE YOU HAD SHORTNESS OF BREATH: ONCE OR TWICE A WEEK
QUESTION_1 LAST FOUR WEEKS HOW MUCH OF THE TIME DID YOUR ASTHMA KEEP YOU FROM GETTING AS MUCH DONE AT WORK, SCHOOL OR AT HOME: A LITTLE OF THE TIME
QUESTION_3 LAST FOUR WEEKS HOW OFTEN DID YOUR ASTHMA SYMPTOMS (WHEEZING, COUGHING, SHORTNESS OF BREATH, CHEST TIGHTNESS OR PAIN) WAKE YOU UP AT NIGHT OR EARLIER THAN USUAL IN THE MORNING: ONCE OR TWICE
ACT_TOTALSCORE: 21
ACT_TOTALSCORE: 21
QUESTION_5 LAST FOUR WEEKS HOW WOULD YOU RATE YOUR ASTHMA CONTROL: COMPLETELY CONTROLLED
QUESTION_4 LAST FOUR WEEKS HOW OFTEN HAVE YOU USED YOUR RESCUE INHALER OR NEBULIZER MEDICATION (SUCH AS ALBUTEROL): ONCE A WEEK OR LESS

## 2024-06-19 NOTE — PROGRESS NOTES
"  Assessment & Plan     Type 2 diabetes mellitus without complication, without long-term current use of insulin (H)    Stable.  Continue current medications.    He has appt 7/23/24: preop for cataract surgery on 8/2/24.      Mild intermittent asthma without complication    Stable.            BMI  Estimated body mass index is 29.3 kg/m  as calculated from the following:    Height as of this encounter: 1.602 m (5' 3.07\").    Weight as of this encounter: 75.2 kg (165 lb 12.8 oz).             Prasanna Patel is a 69 year old, presenting for the following health issues:  Hypertension and Diabetes        6/19/2024     7:23 AM   Additional Questions   Roomed by rhonda nunez   Accompanied by niece     History of Present Illness       Diabetes:   He presents for follow up of diabetes.  He is checking home blood glucose two times daily.   He checks blood glucose before meals.  Blood glucose is sometimes over 200 and never under 70.  When his blood glucose is low, the patient is asymptomatic for confusion, blurred vision, lethargy and reports not feeling dizzy, shaky, or weak.   He has no concerns regarding his diabetes at this time.  He is having excessive thirst and blurry vision.            Hypertension: He presents for follow up of hypertension.  He does check blood pressure  regularly outside of the clinic. Outpatient blood pressures have not been over 140/90. He does not follow a low salt diet.               Fingerstick 122 today.    Uses inhaler about once per week.    Current Outpatient Medications   Medication Sig Dispense Refill    albuterol (PROAIR HFA/PROVENTIL HFA/VENTOLIN HFA) 108 (90 Base) MCG/ACT inhaler Inhale 2 puffs into the lungs every 4 hours as needed 18 g 11    amLODIPine (NORVASC) 2.5 MG tablet Take 1 tablet (2.5 mg) by mouth daily 30 tablet 11    atorvastatin (LIPITOR) 20 MG tablet Take 1 tablet (20 mg) by mouth daily 30 tablet 11    blood glucose (NO BRAND SPECIFIED) lancets standard Use to test blood " "sugar 1 time daily or as directed. 50 each 11    blood glucose (NO BRAND SPECIFIED) test strip Use to test blood sugar 1 time1 daily or as directed. 50 strip 11    blood glucose monitoring (NO BRAND SPECIFIED) meter device kit Use to test blood sugar 1 time daily or as directed. 1 kit 0    glipiZIDE (GLUCOTROL XL) 5 MG 24 hr tablet Take 2 tablets (10 mg) by mouth daily (with breakfast) 60 tablet 11    Global Inject Ease Lancets 30G MISC       metFORMIN (GLUCOPHAGE) 500 MG tablet TAKE 2 TABLETS(1000 MG) BY MOUTH TWICE DAILY WITH MEALS 120 tablet 11    pioglitazone (ACTOS) 45 MG tablet Take 1 tablet (45 mg) by mouth daily 30 tablet 11             Objective    /80   Pulse 65   Temp 99  F (37.2  C) (Oral)   Resp 16   Ht 1.602 m (5' 3.07\")   Wt 75.2 kg (165 lb 12.8 oz)   SpO2 98%   BMI 29.30 kg/m    Body mass index is 29.3 kg/m .  Physical Exam     Heart normal  Lungs normal          Signed Electronically by: Omar Chaney MD    Prior to immunization administration, verified patients identity using patient s name and date of birth. Please see Immunization Activity for additional information.     Screening Questionnaire for Adult Immunization    Are you sick today?   No   Do you have allergies to medications, food, a vaccine component or latex?   No   Have you ever had a serious reaction after receiving a vaccination?   No   Do you have a long-term health problem with heart, lung, kidney, or metabolic disease (e.g., diabetes), asthma, a blood disorder, no spleen, complement component deficiency, a cochlear implant, or a spinal fluid leak?  Are you on long-term aspirin therapy?   Yes   Do you have cancer, leukemia, HIV/AIDS, or any other immune system problem?   No   Do you have a parent, brother, or sister with an immune system problem?   No   In the past 3 months, have you taken medications that affect  your immune system, such as prednisone, other steroids, or anticancer drugs; drugs for the treatment of " rheumatoid arthritis, Crohn s disease, or psoriasis; or have you had radiation treatments?   No   Have you had a seizure, or a brain or other nervous system problem?   No   During the past year, have you received a transfusion of blood or blood    products, or been given immune (gamma) globulin or antiviral drug?   No   For women: Are you pregnant or is there a chance you could become       pregnant during the next month?   No   Have you received any vaccinations in the past 4 weeks?   No     Immunization questionnaire was positive for at least one answer.  Notified Dr Chaney.      Patient instructed to remain in clinic for 15 minutes afterwards, and to report any adverse reactions.     Screening performed by Diana Márquez MA on 6/19/2024 at 7:29 AM.

## 2024-07-23 ENCOUNTER — OFFICE VISIT (OUTPATIENT)
Dept: FAMILY MEDICINE | Facility: CLINIC | Age: 69
End: 2024-07-23
Payer: MEDICARE

## 2024-07-23 VITALS
BODY MASS INDEX: 29.37 KG/M2 | DIASTOLIC BLOOD PRESSURE: 80 MMHG | OXYGEN SATURATION: 98 % | RESPIRATION RATE: 16 BRPM | SYSTOLIC BLOOD PRESSURE: 134 MMHG | HEIGHT: 63 IN | WEIGHT: 165.75 LBS | HEART RATE: 65 BPM | TEMPERATURE: 98 F

## 2024-07-23 DIAGNOSIS — Z01.818 PREOPERATIVE EXAMINATION: Primary | ICD-10-CM

## 2024-07-23 DIAGNOSIS — H26.9 CATARACT OF LEFT EYE, UNSPECIFIED CATARACT TYPE: ICD-10-CM

## 2024-07-23 DIAGNOSIS — E11.9 TYPE 2 DIABETES MELLITUS WITHOUT COMPLICATION, WITHOUT LONG-TERM CURRENT USE OF INSULIN (H): ICD-10-CM

## 2024-07-23 PROCEDURE — 99214 OFFICE O/P EST MOD 30 MIN: CPT | Performed by: FAMILY MEDICINE

## 2024-07-23 NOTE — PROGRESS NOTES
Preoperative Evaluation  [address]  [place] [address]  Phone: [phone]  Fax: [phone]  Primary Provider: Omar Chaney MD  Pre-op Performing Provider: Omar Chaney MD  Jul 23, 2024 7/23/2024   Surgical Information   What procedure is being done? Left Eye Cataract surgery   Facility or Hospital where procedure/surgery will be performed: [facility] in [place]   Who is doing the procedure / surgery? Dr. Gianfranco Salas   Date of surgery / procedure: 8/2/2024   Time of surgery / procedure: TBD   Where do you plan to recover after surgery? at home with family        Fax number for surgical facility: [phone]    Assessment & Plan     The proposed surgical procedure is considered LOW risk.    Preoperative examination      Cataract of left eye, unspecified cataract type      Type 2 diabetes mellitus without complication, without long-term current use of insulin (H)                   Antiplatelet or Anticoagulation Medication Instructions   - Patient is on no antiplatelet or anticoagulation medications.    Additional Medication Instructions  Take all scheduled medications on the day of surgery EXCEPT for modifications listed below:   - metformin: DO NOT TAKE day of surgery.   - sulfonylurea (e.g. glyburide, glipizide): DO NOT TAKE day of surgery   - Thiazolidinedione (e.g. rosiglitazone, pioglitazone): DO NOT TAKE day of surgery.    Recommendation  Approval given to proceed with proposed procedure, without further diagnostic evaluation.    Subjective   Htun is a 69 year old, presenting for the following:  Pre-Op Exam          7/23/2024     7:04 AM   Additional Questions   Roomed by Barbara ALLEN   Accompanied by Spouse     HPI related to upcoming procedure: Left cataract.  Diabetes, controlled.  Hypertension, controlled.        7/23/2024   Pre-Op Questionnaire   Have you ever had a heart attack or stroke? No   Have you ever had  surgery on your heart or blood vessels, such as a stent placement, a coronary artery bypass, or surgery on an artery in your head, neck, heart, or legs? No   Do you have chest pain with activity? No   Do you have a history of heart failure? No   Do you currently have a cold, bronchitis or symptoms of other infection? No   Do you have a cough, shortness of breath, or wheezing? No   Do you or anyone in your family have previous history of blood clots? No   Do you or does anyone in your family have a serious bleeding problem such as prolonged bleeding following surgeries or cuts? No   Have you ever had problems with anemia or been told to take iron pills? No   Have you had any abnormal blood loss such as black, tarry or bloody stools? No   Have you ever had a blood transfusion? No   Are you willing to have a blood transfusion if it is medically needed before, during, or after your surgery? Yes   Have you or any of your relatives ever had problems with anesthesia? No   Do you have sleep apnea, excessive snoring or daytime drowsiness? No   Do you have any artifical heart valves or other implanted medical devices like a pacemaker, defibrillator, or continuous glucose monitor? No   Do you have artificial joints? No   Are you allergic to latex? No            Preoperative Review of    reviewed - no record of controlled substances prescribed.          Patient Active Problem List    Diagnosis Date Noted    Memory loss 10/18/2019     Priority: Medium    Type 2 diabetes mellitus without complication, without long-term current use of insulin (H) 01/09/2018     Priority: Medium    Primary insomnia 01/09/2018     Priority: Medium    Mild intermittent asthma without complication      Priority: Medium     Created by Conversion        Mixed hyperlipidemia 09/23/2016     Priority: Medium    Essential hypertension with goal blood pressure less than 140/90 09/23/2016     Priority: Medium      No past medical history on file.  No  "past surgical history on file.  Current Outpatient Medications   Medication Sig Dispense Refill    albuterol (PROAIR HFA/PROVENTIL HFA/VENTOLIN HFA) 108 (90 Base) MCG/ACT inhaler Inhale 2 puffs into the lungs every 4 hours as needed 18 g 11    amLODIPine (NORVASC) 2.5 MG tablet Take 1 tablet (2.5 mg) by mouth daily 30 tablet 11    atorvastatin (LIPITOR) 20 MG tablet Take 1 tablet (20 mg) by mouth daily 30 tablet 11    blood glucose (NO BRAND SPECIFIED) lancets standard Use to test blood sugar 1 time daily or as directed. 50 each 11    blood glucose (NO BRAND SPECIFIED) test strip Use to test blood sugar 1 time1 daily or as directed. 50 strip 11    blood glucose monitoring (NO BRAND SPECIFIED) meter device kit Use to test blood sugar 1 time daily or as directed. 1 kit 0    glipiZIDE (GLUCOTROL XL) 5 MG 24 hr tablet Take 2 tablets (10 mg) by mouth daily (with breakfast) 60 tablet 11    Global Inject Ease Lancets 30G MISC       metFORMIN (GLUCOPHAGE) 500 MG tablet TAKE 2 TABLETS(1000 MG) BY MOUTH TWICE DAILY WITH MEALS 120 tablet 11    pioglitazone (ACTOS) 45 MG tablet Take 1 tablet (45 mg) by mouth daily 30 tablet 11       No Known Allergies     Social History     Tobacco Use    Smoking status: Former     Types: Cigars     Quit date: 2006     Years since quittin.2     Passive exposure: Never    Smokeless tobacco: Current     Types: Chew    Tobacco comments:     sometimes he chews betel nut w/ tobacco   Substance Use Topics    Alcohol use: No       History   Drug Use No               Objective    /80   Pulse 65   Temp 98  F (36.7  C) (Oral)   Resp 16   Ht 1.602 m (5' 3.07\")   Wt 75.2 kg (165 lb 12 oz)   SpO2 98%   BMI 29.30 kg/m     Estimated body mass index is 29.3 kg/m  as calculated from the following:    Height as of this encounter: 1.602 m (5' 3.07\").    Weight as of this encounter: 75.2 kg (165 lb 12 oz).  Physical Exam    Eyes: EOM full, pupils normal, conjunctivae normal  Ears: TM's and " canals normal  Oropharynx: normal  Neck: supple without adenopathy or thyromegaly  Lungs: normal  Heart: regular rhythm, normal rate, no murmur  Abdomen: no HSM, mass or tenderness  Extremities: FROM, normal strength and sensation    Recent Labs   Lab Test 05/10/24  1122 01/10/24  1031     --    POTASSIUM 4.5  --    CR 0.73  --    A1C 8.8* 14.6*        Diagnostics  No labs were ordered during this visit.   No EKG required for low risk surgery (cataract, skin procedure, breast biopsy, etc).    Revised Cardiac Risk Index (RCRI)  The patient has the following serious cardiovascular risks for perioperative complications:   - No serious cardiac risks = 0 points     RCRI Interpretation: 0 points: Class I (very low risk - 0.4% complication rate)         Signed Electronically by: Omar Chaney MD  A copy of this evaluation report is provided to the requesting physician.

## 2024-08-15 ENCOUNTER — PATIENT OUTREACH (OUTPATIENT)
Dept: GERIATRIC MEDICINE | Facility: CLINIC | Age: 69
End: 2024-08-15
Payer: MEDICARE

## 2024-08-16 NOTE — PROGRESS NOTES
[facility] Care Coordination Contact    No longer active with [facility] community case management effective 03/31/20254.  Reason for community disenrollment: [place] Term.    Omega Ho  Care Management Specialist  [facility]  [phone]

## 2024-08-21 ENCOUNTER — PATIENT OUTREACH (OUTPATIENT)
Dept: CARE COORDINATION | Facility: CLINIC | Age: 69
End: 2024-08-21
Payer: MEDICARE

## 2024-08-28 DIAGNOSIS — E78.2 MIXED HYPERLIPIDEMIA: ICD-10-CM

## 2024-08-28 DIAGNOSIS — E11.9 TYPE 2 DIABETES MELLITUS WITHOUT COMPLICATION, WITHOUT LONG-TERM CURRENT USE OF INSULIN (H): ICD-10-CM

## 2024-08-28 RX ORDER — ATORVASTATIN CALCIUM 20 MG/1
TABLET, FILM COATED ORAL
Qty: 90 TABLET | Refills: 0 | Status: SHIPPED | OUTPATIENT
Start: 2024-08-28

## 2024-08-28 RX ORDER — GLIPIZIDE 5 MG/1
TABLET, FILM COATED, EXTENDED RELEASE ORAL
Qty: 180 TABLET | Refills: 0 | Status: SHIPPED | OUTPATIENT
Start: 2024-08-28

## 2024-09-04 ENCOUNTER — PATIENT OUTREACH (OUTPATIENT)
Dept: CARE COORDINATION | Facility: CLINIC | Age: 69
End: 2024-09-04
Payer: MEDICARE

## 2024-09-30 DIAGNOSIS — I10 ESSENTIAL HYPERTENSION WITH GOAL BLOOD PRESSURE LESS THAN 140/90: ICD-10-CM

## 2024-09-30 RX ORDER — AMLODIPINE BESYLATE 2.5 MG/1
TABLET ORAL
Qty: 30 TABLET | Refills: 8 | Status: SHIPPED | OUTPATIENT
Start: 2024-09-30

## 2024-12-10 ENCOUNTER — PATIENT OUTREACH (OUTPATIENT)
Dept: GERIATRIC MEDICINE | Facility: CLINIC | Age: 69
End: 2024-12-10
Payer: MEDICARE

## 2024-12-10 NOTE — PROGRESS NOTES
[facility] Care Coordination Contact    Member became effective with  [facility] on 12/01/24 with Nicole MSC+.  Previous Health Plan: [place]/Fee For Service  Previous Care System: [place] [facility]  Previous care coordinators name and number: Waiver Type: BUCKY SHELDON  [place]UST received: No: Requested on 12/9/24. thru [place]: Fax: [phone] .  Mailed welcome letter and Nicole When to Contact Your Care Coordinator  Address/Phone discrepancy: none  [place] Choices but not prepped for visit, please task CMS if needed.    Omega Ho  Care Management Specialist  [facility]  [phone]

## 2024-12-10 NOTE — LETTER
December 10, 2024    JORGE KURTZKOLE  [address] [address]    Dear  Jorge,    Welcome to [facility] s MSC+ health program. My name is MT Suh. I am your MSC+ care coordinator. You are eligible for Care Coordination through [facility] MSC+ plan.    As your care coordinator, we ll:  Meet to go over your care coordination benefits  Talk about your physical and mental health care needs   Review your preventative care needs  Create a plan that meets your needs with the services you choose    What happens next?  I ll call you soon to introduce myself and tell you more about my role. We ll then plan time to go over your health and safety needs. Our goal is to keep you as healthy and independent as possible.    Soon, you will receive a new MSC+ member identification (ID) card from [facility]. When you receive it, please use this card along with your [place] [facility] Programs card and Prescription Drug Coverage Program card. When you receive, it please use this card where you get your health services. If you have Medicare, you will need to show your Medicare card when you get health services.    The [facility] care coordination program is voluntary and offered to you at no cost. If you wish to stop being in the care coordination program or have questions, call me at [phone]. If you reach my voicemail, leave a message and your phone number. TTY users, call the [place] Relay at 015 or 1-275.406.5758 (paypfe-jd-jhvwob relay service).    Sincerely,      MT Suh  [phone]  [email or web address]          [id number] accepted   [id number]       [id number] (07/2022)

## 2025-01-07 ENCOUNTER — PATIENT OUTREACH (OUTPATIENT)
Dept: GERIATRIC MEDICINE | Facility: CLINIC | Age: 70
End: 2025-01-07
Payer: MEDICARE

## 2025-01-07 NOTE — PROGRESS NOTES
[facility] Care Coordination Contact      [facility]:  Emailed required CFSS documents to [facility].  Mailed member supplemental summary chart and assessment summary letter.       Tiera Sarabia  Care Management Specialist   [facility]   [phone]

## 2025-01-22 ENCOUNTER — PATIENT OUTREACH (OUTPATIENT)
Dept: GERIATRIC MEDICINE | Facility: CLINIC | Age: 70
End: 2025-01-22
Payer: MEDICARE

## 2025-01-22 NOTE — PROGRESS NOTES
[facility] Care Coordination Contact    Received after visit chart from care coordinator.  Completed following tasks: Mailed copy of support plan to member, Mailed [place] Cho[place] signature sheet pages 3-4, Mailed Safe Medication Disposal , and Mailed Transition of Care Member Handout.    Tiera Sarabia  Care Management Specialist   [facility]   [phone]

## 2025-01-22 NOTE — LETTER
January 22, 2025       JORGE AUSTIN  [address] [address]      Dear Jorge,    At [facility], we re dedicated to improving your health and wellness. Enclosed is the Support Plan developed with you on 12/26/2024. Please review the Support Plan carefully.    As a reminder, during your visit we talked about:   Ways to manage your physical and mental health   Using health care to maintain and improve your health    Your preventive care needs      Remember to contact your care coordinator if you:   Are hospitalized or plan to be hospitalized    Have a fall     Have a change in your physical or mental health   Need help finding support or services    If you have questions or don t agree with your Support Plan, call me at [phone]. You can also call me if your needs change. TTY users call the [place] Relay at 846 or 1-675.102.7234 (vdxssj-hk-esziie relay service).    Sincerely,       MT Suh  [phone]  [email or web address]                      [id number] accepted     (06/2024)                [address] [place] [address]  [phone]  fax [phone]  [facility]

## 2025-01-26 DIAGNOSIS — E11.9 TYPE 2 DIABETES MELLITUS WITHOUT COMPLICATION, WITHOUT LONG-TERM CURRENT USE OF INSULIN (H): ICD-10-CM

## 2025-01-28 RX ORDER — PIOGLITAZONE 45 MG/1
TABLET ORAL
Qty: 30 TABLET | Refills: 11 | Status: SHIPPED | OUTPATIENT
Start: 2025-01-28